# Patient Record
Sex: FEMALE | Race: WHITE | HISPANIC OR LATINO | Employment: UNEMPLOYED | URBAN - METROPOLITAN AREA
[De-identification: names, ages, dates, MRNs, and addresses within clinical notes are randomized per-mention and may not be internally consistent; named-entity substitution may affect disease eponyms.]

---

## 2023-04-18 ENCOUNTER — HOSPITAL ENCOUNTER (INPATIENT)
Facility: HOSPITAL | Age: 28
LOS: 1 days | Discharge: HOME | DRG: 514 | End: 2023-04-20
Attending: EMERGENCY MEDICINE | Admitting: ORTHOPAEDIC SURGERY

## 2023-04-18 ENCOUNTER — ANESTHESIA (OUTPATIENT)
Dept: SURGERY | Facility: HOSPITAL | Age: 28
DRG: 514 | End: 2023-04-18

## 2023-04-18 ENCOUNTER — ANESTHESIA EVENT (OUTPATIENT)
Dept: SURGERY | Facility: HOSPITAL | Age: 28
DRG: 514 | End: 2023-04-18

## 2023-04-18 DIAGNOSIS — S62.609B OPEN FINGER FRACTURE: ICD-10-CM

## 2023-04-18 DIAGNOSIS — S62.625B OPEN DISPLACED FRACTURE OF MIDDLE PHALANX OF LEFT RING FINGER, INITIAL ENCOUNTER: Primary | ICD-10-CM

## 2023-04-18 DIAGNOSIS — S62.605B: Primary | ICD-10-CM

## 2023-04-18 DIAGNOSIS — S62.625B OPEN DISPLACED FRACTURE OF MIDDLE PHALANX OF LEFT RING FINGER, INITIAL ENCOUNTER: ICD-10-CM

## 2023-04-18 LAB
ALBUMIN SERPL BCP-MCNC: 4.9 G/DL (ref 3.5–5.2)
ALP SERPL-CCNC: 79 U/L (ref 38–126)
ALT SERPL W/O P-5'-P-CCNC: 19 U/L (ref 10–44)
ANION GAP SERPL CALC-SCNC: 10 MMOL/L (ref 8–16)
AST SERPL-CCNC: 30 U/L (ref 15–46)
B-HCG UR QL: NEGATIVE
BASOPHILS # BLD AUTO: 0.05 K/UL (ref 0–0.2)
BASOPHILS NFR BLD: 0.4 % (ref 0–1.9)
BILIRUB SERPL-MCNC: 0.3 MG/DL (ref 0.1–1)
CALCIUM SERPL-MCNC: 9.2 MG/DL (ref 8.7–10.5)
CHLORIDE SERPL-SCNC: 105 MMOL/L (ref 95–110)
CO2 SERPL-SCNC: 24 MMOL/L (ref 23–29)
CREAT SERPL-MCNC: 0.75 MG/DL (ref 0.5–1.4)
DIFFERENTIAL METHOD: ABNORMAL
EOSINOPHIL # BLD AUTO: 0.1 K/UL (ref 0–0.5)
EOSINOPHIL NFR BLD: 1.1 % (ref 0–8)
ERYTHROCYTE [DISTWIDTH] IN BLOOD BY AUTOMATED COUNT: 12.7 % (ref 11.5–14.5)
EST. GFR  (NO RACE VARIABLE): >60 ML/MIN/1.73 M^2
GLUCOSE SERPL-MCNC: 94 MG/DL (ref 70–110)
HCT VFR BLD AUTO: 38.7 % (ref 37–48.5)
HGB BLD-MCNC: 13 G/DL (ref 12–16)
IMM GRANULOCYTES # BLD AUTO: 0.05 K/UL (ref 0–0.04)
IMM GRANULOCYTES NFR BLD AUTO: 0.4 % (ref 0–0.5)
LYMPHOCYTES # BLD AUTO: 4.7 K/UL (ref 1–4.8)
LYMPHOCYTES NFR BLD: 38.1 % (ref 18–48)
MCH RBC QN AUTO: 31.1 PG (ref 27–31)
MCHC RBC AUTO-ENTMCNC: 33.6 G/DL (ref 32–36)
MCV RBC AUTO: 93 FL (ref 82–98)
MONOCYTES # BLD AUTO: 0.8 K/UL (ref 0.3–1)
MONOCYTES NFR BLD: 6.3 % (ref 4–15)
NEUTROPHILS # BLD AUTO: 6.6 K/UL (ref 1.8–7.7)
NEUTROPHILS NFR BLD: 53.7 % (ref 38–73)
NRBC BLD-RTO: 0 /100 WBC
PLATELET # BLD AUTO: 263 K/UL (ref 150–450)
PMV BLD AUTO: 10.1 FL (ref 9.2–12.9)
POTASSIUM SERPL-SCNC: 3.5 MMOL/L (ref 3.5–5.1)
PROT SERPL-MCNC: 8 G/DL (ref 6–8.4)
RBC # BLD AUTO: 4.18 M/UL (ref 4–5.4)
SODIUM SERPL-SCNC: 139 MMOL/L (ref 136–145)
UUN UR-MCNC: 16 MG/DL (ref 7–17)
WBC # BLD AUTO: 12.32 K/UL (ref 3.9–12.7)

## 2023-04-18 PROCEDURE — 25000003 PHARM REV CODE 250: Performed by: NURSE ANESTHETIST, CERTIFIED REGISTERED

## 2023-04-18 PROCEDURE — 11012 PR DEBRIDE ASSOC OPEN FX/DISLO SKIN/MUS/BONE: ICD-10-PCS | Mod: 51,,, | Performed by: ORTHOPAEDIC SURGERY

## 2023-04-18 PROCEDURE — 85025 COMPLETE CBC W/AUTO DIFF WBC: CPT | Mod: ER | Performed by: EMERGENCY MEDICINE

## 2023-04-18 PROCEDURE — 99285 EMERGENCY DEPT VISIT HI MDM: CPT | Mod: 25

## 2023-04-18 PROCEDURE — G0378 HOSPITAL OBSERVATION PER HR: HCPCS

## 2023-04-18 PROCEDURE — 63600175 PHARM REV CODE 636 W HCPCS: Performed by: ORTHOPAEDIC SURGERY

## 2023-04-18 PROCEDURE — 29130 APPL FINGER SPLINT STATIC: CPT | Mod: F3

## 2023-04-18 PROCEDURE — 36000708 HC OR TIME LEV III 1ST 15 MIN: Performed by: ORTHOPAEDIC SURGERY

## 2023-04-18 PROCEDURE — 26746 TREAT FINGER FRACTURE EACH: CPT | Mod: F3,,, | Performed by: ORTHOPAEDIC SURGERY

## 2023-04-18 PROCEDURE — 63600175 PHARM REV CODE 636 W HCPCS: Performed by: NURSE ANESTHETIST, CERTIFIED REGISTERED

## 2023-04-18 PROCEDURE — 63600175 PHARM REV CODE 636 W HCPCS: Mod: ER | Performed by: EMERGENCY MEDICINE

## 2023-04-18 PROCEDURE — 96375 TX/PRO/DX INJ NEW DRUG ADDON: CPT

## 2023-04-18 PROCEDURE — 96376 TX/PRO/DX INJ SAME DRUG ADON: CPT

## 2023-04-18 PROCEDURE — 71000033 HC RECOVERY, INTIAL HOUR: Performed by: ORTHOPAEDIC SURGERY

## 2023-04-18 PROCEDURE — 37000008 HC ANESTHESIA 1ST 15 MINUTES: Performed by: ORTHOPAEDIC SURGERY

## 2023-04-18 PROCEDURE — 37000009 HC ANESTHESIA EA ADD 15 MINS: Performed by: ORTHOPAEDIC SURGERY

## 2023-04-18 PROCEDURE — D9220A PRA ANESTHESIA: ICD-10-PCS | Mod: ,,, | Performed by: ANESTHESIOLOGY

## 2023-04-18 PROCEDURE — 80053 COMPREHEN METABOLIC PANEL: CPT | Mod: ER | Performed by: EMERGENCY MEDICINE

## 2023-04-18 PROCEDURE — 63600175 PHARM REV CODE 636 W HCPCS: Performed by: ANESTHESIOLOGY

## 2023-04-18 PROCEDURE — 96365 THER/PROPH/DIAG IV INF INIT: CPT

## 2023-04-18 PROCEDURE — 81025 URINE PREGNANCY TEST: CPT | Performed by: ORTHOPAEDIC SURGERY

## 2023-04-18 PROCEDURE — 96367 TX/PROPH/DG ADDL SEQ IV INF: CPT

## 2023-04-18 PROCEDURE — C1769 GUIDE WIRE: HCPCS | Performed by: ORTHOPAEDIC SURGERY

## 2023-04-18 PROCEDURE — 11012 DEB SKIN BONE AT FX SITE: CPT | Mod: 51,,, | Performed by: ORTHOPAEDIC SURGERY

## 2023-04-18 PROCEDURE — 36000709 HC OR TIME LEV III EA ADD 15 MIN: Performed by: ORTHOPAEDIC SURGERY

## 2023-04-18 PROCEDURE — D9220A PRA ANESTHESIA: Mod: ,,, | Performed by: ANESTHESIOLOGY

## 2023-04-18 PROCEDURE — 90471 IMMUNIZATION ADMIN: CPT | Mod: ER | Performed by: EMERGENCY MEDICINE

## 2023-04-18 PROCEDURE — 90715 TDAP VACCINE 7 YRS/> IM: CPT | Mod: ER | Performed by: EMERGENCY MEDICINE

## 2023-04-18 PROCEDURE — 26746 PR OPEN TX ARTICULAR FRACTURE MCP/IP JOINT EA: ICD-10-PCS | Mod: F3,,, | Performed by: ORTHOPAEDIC SURGERY

## 2023-04-18 DEVICE — KWIRE 9 .035 9MM: Type: IMPLANTABLE DEVICE | Site: FINGER | Status: FUNCTIONAL

## 2023-04-18 RX ORDER — SUCCINYLCHOLINE CHLORIDE 20 MG/ML
INJECTION INTRAMUSCULAR; INTRAVENOUS
Status: DISCONTINUED | OUTPATIENT
Start: 2023-04-18 | End: 2023-04-18

## 2023-04-18 RX ORDER — VANCOMYCIN HYDROCHLORIDE 1 G/20ML
INJECTION, POWDER, LYOPHILIZED, FOR SOLUTION INTRAVENOUS
Status: DISCONTINUED | OUTPATIENT
Start: 2023-04-18 | End: 2023-04-18 | Stop reason: HOSPADM

## 2023-04-18 RX ORDER — ONDANSETRON 2 MG/ML
4 INJECTION INTRAMUSCULAR; INTRAVENOUS EVERY 6 HOURS PRN
Status: DISCONTINUED | OUTPATIENT
Start: 2023-04-18 | End: 2023-04-20 | Stop reason: HOSPADM

## 2023-04-18 RX ORDER — SODIUM CHLORIDE 0.9 % (FLUSH) 0.9 %
10 SYRINGE (ML) INJECTION
Status: DISCONTINUED | OUTPATIENT
Start: 2023-04-18 | End: 2023-04-18

## 2023-04-18 RX ORDER — CEFAZOLIN SODIUM 2 G/50ML
2 SOLUTION INTRAVENOUS
Status: COMPLETED | OUTPATIENT
Start: 2023-04-19 | End: 2023-04-20

## 2023-04-18 RX ORDER — CEFAZOLIN SODIUM 1 G/3ML
INJECTION, POWDER, FOR SOLUTION INTRAMUSCULAR; INTRAVENOUS
Status: DISCONTINUED | OUTPATIENT
Start: 2023-04-18 | End: 2023-04-18

## 2023-04-18 RX ORDER — ONDANSETRON 2 MG/ML
4 INJECTION INTRAMUSCULAR; INTRAVENOUS
Status: COMPLETED | OUTPATIENT
Start: 2023-04-18 | End: 2023-04-18

## 2023-04-18 RX ORDER — CEFAZOLIN SODIUM 1 G/50ML
1 SOLUTION INTRAVENOUS
Status: COMPLETED | OUTPATIENT
Start: 2023-04-18 | End: 2023-04-18

## 2023-04-18 RX ORDER — CEFAZOLIN SODIUM 2 G/50ML
2 SOLUTION INTRAVENOUS
Status: CANCELLED | OUTPATIENT
Start: 2023-04-18

## 2023-04-18 RX ORDER — MORPHINE SULFATE 4 MG/ML
6 INJECTION, SOLUTION INTRAMUSCULAR; INTRAVENOUS
Status: COMPLETED | OUTPATIENT
Start: 2023-04-18 | End: 2023-04-18

## 2023-04-18 RX ORDER — ONDANSETRON 2 MG/ML
INJECTION INTRAMUSCULAR; INTRAVENOUS
Status: DISCONTINUED | OUTPATIENT
Start: 2023-04-18 | End: 2023-04-18

## 2023-04-18 RX ORDER — CEFAZOLIN SODIUM 2 G/50ML
2 SOLUTION INTRAVENOUS
Status: DISCONTINUED | OUTPATIENT
Start: 2023-04-18 | End: 2023-04-19

## 2023-04-18 RX ORDER — FENTANYL CITRATE 50 UG/ML
INJECTION, SOLUTION INTRAMUSCULAR; INTRAVENOUS
Status: DISCONTINUED | OUTPATIENT
Start: 2023-04-18 | End: 2023-04-18

## 2023-04-18 RX ORDER — TRAMADOL HYDROCHLORIDE 50 MG/1
50 TABLET ORAL EVERY 4 HOURS PRN
Status: DISCONTINUED | OUTPATIENT
Start: 2023-04-18 | End: 2023-04-20 | Stop reason: HOSPADM

## 2023-04-18 RX ORDER — LIDOCAINE HYDROCHLORIDE 20 MG/ML
INJECTION INTRAVENOUS
Status: DISCONTINUED | OUTPATIENT
Start: 2023-04-18 | End: 2023-04-18

## 2023-04-18 RX ORDER — HYDROMORPHONE HYDROCHLORIDE 2 MG/ML
0.5 INJECTION, SOLUTION INTRAMUSCULAR; INTRAVENOUS; SUBCUTANEOUS EVERY 5 MIN PRN
Status: DISCONTINUED | OUTPATIENT
Start: 2023-04-18 | End: 2023-04-18

## 2023-04-18 RX ORDER — CIPROFLOXACIN 2 MG/ML
400 INJECTION, SOLUTION INTRAVENOUS
Status: COMPLETED | OUTPATIENT
Start: 2023-04-18 | End: 2023-04-20

## 2023-04-18 RX ORDER — ONDANSETRON 2 MG/ML
4 INJECTION INTRAMUSCULAR; INTRAVENOUS DAILY PRN
Status: DISCONTINUED | OUTPATIENT
Start: 2023-04-18 | End: 2023-04-18

## 2023-04-18 RX ORDER — KETOROLAC TROMETHAMINE 30 MG/ML
30 INJECTION, SOLUTION INTRAMUSCULAR; INTRAVENOUS ONCE
Status: COMPLETED | OUTPATIENT
Start: 2023-04-18 | End: 2023-04-18

## 2023-04-18 RX ORDER — MIDAZOLAM HYDROCHLORIDE 1 MG/ML
INJECTION INTRAMUSCULAR; INTRAVENOUS
Status: DISCONTINUED | OUTPATIENT
Start: 2023-04-18 | End: 2023-04-18

## 2023-04-18 RX ORDER — OXYCODONE AND ACETAMINOPHEN 7.5; 325 MG/1; MG/1
1 TABLET ORAL EVERY 4 HOURS PRN
Status: DISCONTINUED | OUTPATIENT
Start: 2023-04-18 | End: 2023-04-20 | Stop reason: HOSPADM

## 2023-04-18 RX ORDER — PROPOFOL 10 MG/ML
VIAL (ML) INTRAVENOUS
Status: DISCONTINUED | OUTPATIENT
Start: 2023-04-18 | End: 2023-04-18

## 2023-04-18 RX ORDER — KETOROLAC TROMETHAMINE 30 MG/ML
INJECTION, SOLUTION INTRAMUSCULAR; INTRAVENOUS
Status: DISCONTINUED | OUTPATIENT
Start: 2023-04-18 | End: 2023-04-18

## 2023-04-18 RX ADMIN — SODIUM CHLORIDE, SODIUM LACTATE, POTASSIUM CHLORIDE, AND CALCIUM CHLORIDE: .6; .31; .03; .02 INJECTION, SOLUTION INTRAVENOUS at 04:04

## 2023-04-18 RX ADMIN — MIDAZOLAM HYDROCHLORIDE 2 MG: 1 INJECTION, SOLUTION INTRAMUSCULAR; INTRAVENOUS at 04:04

## 2023-04-18 RX ADMIN — MORPHINE SULFATE 6 MG: 4 INJECTION INTRAVENOUS at 12:04

## 2023-04-18 RX ADMIN — KETOROLAC TROMETHAMINE 30 MG: 30 INJECTION, SOLUTION INTRAMUSCULAR; INTRAVENOUS at 05:04

## 2023-04-18 RX ADMIN — TETANUS TOXOID, REDUCED DIPHTHERIA TOXOID AND ACELLULAR PERTUSSIS VACCINE, ADSORBED 0.5 ML: 5; 2.5; 8; 8; 2.5 SUSPENSION INTRAMUSCULAR at 12:04

## 2023-04-18 RX ADMIN — HYDROMORPHONE HYDROCHLORIDE 0.5 MG: 2 INJECTION, SOLUTION INTRAMUSCULAR; INTRAVENOUS; SUBCUTANEOUS at 05:04

## 2023-04-18 RX ADMIN — ONDANSETRON 8 MG: 2 INJECTION, SOLUTION INTRAMUSCULAR; INTRAVENOUS at 05:04

## 2023-04-18 RX ADMIN — FENTANYL CITRATE 100 MCG: 50 INJECTION, SOLUTION INTRAMUSCULAR; INTRAVENOUS at 04:04

## 2023-04-18 RX ADMIN — CEFAZOLIN 2 G: 330 INJECTION, POWDER, FOR SOLUTION INTRAMUSCULAR; INTRAVENOUS at 04:04

## 2023-04-18 RX ADMIN — CIPROFLOXACIN 400 MG: 2 INJECTION, SOLUTION INTRAVENOUS at 09:04

## 2023-04-18 RX ADMIN — HYDROMORPHONE HYDROCHLORIDE 0.5 MG: 2 INJECTION, SOLUTION INTRAMUSCULAR; INTRAVENOUS; SUBCUTANEOUS at 06:04

## 2023-04-18 RX ADMIN — ONDANSETRON HYDROCHLORIDE 4 MG: 2 INJECTION, SOLUTION INTRAMUSCULAR; INTRAVENOUS at 12:04

## 2023-04-18 RX ADMIN — ONDANSETRON HYDROCHLORIDE 4 MG: 2 INJECTION, SOLUTION INTRAMUSCULAR; INTRAVENOUS at 10:04

## 2023-04-18 RX ADMIN — SUCCINYLCHOLINE CHLORIDE 200 MG: 20 INJECTION, SOLUTION INTRAMUSCULAR; INTRAVENOUS at 04:04

## 2023-04-18 RX ADMIN — CEFAZOLIN SODIUM 1 G: 1 SOLUTION INTRAVENOUS at 12:04

## 2023-04-18 RX ADMIN — PROPOFOL 150 MG: 10 INJECTION, EMULSION INTRAVENOUS at 04:04

## 2023-04-18 RX ADMIN — LIDOCAINE HYDROCHLORIDE 50 MG: 20 INJECTION, SOLUTION INTRAVENOUS at 04:04

## 2023-04-18 RX ADMIN — KETOROLAC TROMETHAMINE 30 MG: 30 INJECTION, SOLUTION INTRAMUSCULAR; INTRAVENOUS at 08:04

## 2023-04-18 NOTE — PHARMACY MED REC
"  [facility]           Pharmacy  Admission Medication History     The home medication history was taken by Barbara Amaral.      Medication history obtained from Medications listed below were obtained from: Patient/family.Patient states she is not taking any medications    Based on information gathered for medication list, you may go to "Admission" then "Reconcile Home Medications" tabs to review and/or act upon those items.     The home medication list has been updated by the Pharmacy department.   Please read ALL comments highlighted in yellow.   Please address this information as you see fit.    Feel free to contact us if you have any questions or require assistance.        No current facility-administered medications on file prior to encounter.     No current outpatient medications on file prior to encounter.       Please address this information as you see fit.  Feel free to contact us if you have any questions or require assistance.    Barbara Amaral  [phone]                .        "

## 2023-04-18 NOTE — ANESTHESIA PROCEDURE NOTES
Intubation    Date/Time: 4/18/2023 4:21 PM  Performed by: Noe Cherry CRNA  Authorized by: Christian Sorto MD     Intubation:     Induction:  Intravenous    Intubated:  Postinduction    Mask Ventilation:  Easy mask    Attempts:  1    Attempted By:  CRNA    Method of Intubation:  Video laryngoscopy    Blade:  Beck 3    Laryngeal View Grade: Grade I - full view of cords      Difficult Airway Encountered?: No      Complications:  None    Airway Device:  Oral endotracheal tube    Airway Device Size:  7.5    Style/Cuff Inflation:  Cuffed (inflated to minimal occlusive pressure)    Tube secured:  21    Secured at:  The teeth    Placement Verified By:  Capnometry    Complicating Factors:  None    Findings Post-Intubation:  BS equal bilateral and atraumatic/condition of teeth unchanged

## 2023-04-18 NOTE — NURSING
Patient received from ED in stable condition. Pt oriented to room and call light. Pre op bath given with clean gown. Urine pregnancy test pending. Awaiting for surgery to transport patient.

## 2023-04-18 NOTE — ANESTHESIA PREPROCEDURE EVALUATION
04/18/2023  Maricel Amezquita is a 28 y.o., female.      Pre-op Assessment    I have reviewed the NPO Status.   I have reviewed the Medications.     Review of Systems  Anesthesia Hx:  No problems with previous Anesthesia  Denies Family Hx of Anesthesia complications.   Denies Personal Hx of Anesthesia complications.   Social:  Non-Smoker    Hematology/Oncology:  Hematology Normal   Oncology Normal     EENT/Dental:EENT/Dental Normal   Cardiovascular:  Cardiovascular Normal     Pulmonary:  Pulmonary Normal    Renal/:  Renal/ Normal     Hepatic/GI:  Hepatic/GI Normal    Musculoskeletal:   Left Ring Finger Laceration and Fx   Neurological:  Neurology Normal    Endocrine:  Endocrine Normal    Dermatological:  Skin Normal    Psych:  Psychiatric Normal           Physical Exam  General: Alert and Oriented    Airway:  Mallampati: II   Mouth Opening: Normal  TM Distance: Normal  Tongue: Normal  Neck ROM: Normal ROM    Chest/Lungs:  Clear to auscultation, Normal Respiratory Rate    Heart:  Rate: Normal  Rhythm: Regular Rhythm  Sounds: Normal        Anesthesia Plan  Type of Anesthesia, risks & benefits discussed:    Anesthesia Type: Gen ETT  Intra-op Monitoring Plan: Standard ASA Monitors  Post Op Pain Control Plan: multimodal analgesia and IV/PO Opioids PRN  Induction:  IV  Airway Plan: Direct and Video  Informed Consent: Informed consent signed with the Patient and all parties understand the risks and agree with anesthesia plan.  All questions answered.   ASA Score: 1  Day of Surgery Review of History & Physical: H&P Update referred to the surgeon/provider.    Ready For Surgery From Anesthesia Perspective.     .

## 2023-04-18 NOTE — ED PROVIDER NOTES
Encounter Date: 4/18/2023       History     Chief Complaint   Patient presents with    Laceration     I cut my left ring finger cutting meat with the machine just now.  (Avulsion noted to left 4th finger)      28-year-old female presents after cutting her left ring finger while cutting meat with the machine.  Last tetanus unknown.  Patient wearing to gloves while cutting.  Patient is right-handed.  Patient complains of some numbness to her left ring finger.    History obtained using .    Review of patient's allergies indicates:  No Known Allergies  History reviewed. No pertinent past medical history.  History reviewed. No pertinent surgical history.  History reviewed. No pertinent family history.  Social History     Tobacco Use    Smoking status: Never    Smokeless tobacco: Never     Review of Systems   Skin:  Positive for wound.   Neurological:  Positive for numbness.     Physical Exam     Initial Vitals [04/18/23 1156]   BP Pulse Resp Temp SpO2   (!) 141/86 82 (!) 28 98.3 °F (36.8 °C) 100 %      MAP       --         Physical Exam    Nursing note and vitals reviewed.  Constitutional: She appears distressed.   HENT:   Head: Atraumatic.   Musculoskeletal:      Comments: Open fracture of the left ring finger at the proximal phalanx.  FDP disrupted.     Neurological: She is alert and oriented to person, place, and time.         ED Course   Orthopedic Injury    Date/Time: 4/18/2023 12:37 PM  Performed by: Carlos A Flores MD  Authorized by: Buster Manuel Jr., MD     Location procedure was performed:  [facility] EMERGENCY DEPARTMENT  Injury:     Injury location:  Finger    Location details:  Left ring finger    Injury type:  Fracture (Proximal and middle phalanx including PIP joint)      Pre-procedure assessment:     Neurovascular status: Neurovascularly intact      Distal perfusion: normal      Neurological function: normal      Range of motion: reduced        Selections made in this section will also  lock the Injury type section above.:     Manipulation performed?: No      Immobilization: moist sterile dressing.  Post-procedure assessment:     Neurovascular status: Neurovascularly intact      Distal perfusion: normal      Neurological function: normal      Range of motion: unchanged      Patient tolerance:  Patient tolerated the procedure well with no immediate complications  Labs Reviewed   CBC W/ AUTO DIFFERENTIAL - Abnormal; Notable for the following components:       Result Value    MCH 31.1 (*)     Immature Grans (Abs) 0.05 (*)     All other components within normal limits   COMPREHENSIVE METABOLIC PANEL          Imaging Results              X-Ray Finger 2 or More Views Left (Final result)  Result time 04/18/23 12:21:16      Final result by JIMMIE Peña Sr., MD (04/18/23 12:21:16)                   Impression:      There are fractures involving the distal end of the proximal phalanx and the proximal end of the middle phalanx of the ring finger.  The fracture fragments are displaced dorsally and medially.      Electronically signed by: Damián Peña MD  Date:    04/18/2023  Time:    12:21               Narrative:    EXAMINATION:  XR FINGER 2 OR MORE VIEWS LEFT    CLINICAL HISTORY:  trauma;    COMPARISON:  None    FINDINGS:  There are fractures involving the distal end of the proximal phalanx and the proximal end of the middle phalanx of the ring finger.  The fracture fragments are displaced dorsally and medially.                                       Medications   ceFAZolin (ANCEF) 1 gram in dextrose 5 % 50 mL IVPB (premix) (1 g Intravenous New Bag 4/18/23 1220)   morphine injection 6 mg (has no administration in time range)   Tdap (BOOSTRIX) vaccine injection 0.5 mL (0.5 mLs Intramuscular Given 4/18/23 1211)   morphine injection 6 mg (6 mg Intravenous Given 4/18/23 1205)   ondansetron injection 4 mg (4 mg Intravenous Given 4/18/23 1211)     Medical Decision Making:   Initial Assessment:   28-year-old  female presenting after finger injury where meat cutting machine caused partial amputation and open fracture of the left proximal ring finger.  Patient does have sensation though obvious tendon disruption and open fracture visible.  Patient's tetanus updated.  Ancef given.  Wound thoroughly irrigated and finger splinted.  Will need hand surgery evaluation.           ED Course as of 04/18/23 1238   Tue Apr 18, 2023   1236 X-Ray Finger 2 or More Views Left  Open despite fracture of proximal and middle phalanx of left ring finger.  Discussed case with Dr. Manuel who has accepted patient for transfer for admission for further surgical management. [SN]      ED Course User Index  [SN] Carlos A Flores MD                 Clinical Impression:   Final diagnoses:  [H01.522V] Open nondisplaced fracture of phalanx of left ring finger, unspecified phalanx, initial encounter (Primary)        ED Disposition Condition    Observation Stable                Carlos A Flores MD  04/18/23 1237

## 2023-04-18 NOTE — TRANSFER OF CARE
Anesthesia Transfer of Care Note    Patient: Maricel Amezquita    Procedure(s) Performed: Procedure(s) (LRB):  ORIF, FINGER (Left)    Patient location: PACU    Anesthesia Type: general    Transport from OR: Transported from OR on 6-10 L/min O2 by face mask with adequate spontaneous ventilation    Post pain: adequate analgesia    Post assessment: no apparent anesthetic complications and tolerated procedure well    Post vital signs: stable    Level of consciousness: awake    Nausea/Vomiting: no nausea/vomiting    Complications: none    Transfer of care protocol was followed      Last vitals:   Visit Vitals  /70   Pulse 87   Temp 36.8 °C (98.2 °F)   Resp 19   Ht 5' (1.524 m)   Wt 61.6 kg (135 lb 12.9 oz)   LMP 04/16/2023 (Exact Date)   SpO2 100%   Breastfeeding No   BMI 26.52 kg/m²

## 2023-04-18 NOTE — H&P
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 female sustained injury to her left ring finger earlier today at work   She was cut with a meat cutting device   She was seen at [facility] emergency room noted to have an open fracture of the PIP joint with near complete amputation   There was felt to be some vascular flow to the tip of the finger show the patient was chance transferred to [place] for surgical treatment  She was seen today with the hospital  system         PAST MEDICAL HISTORY: History reviewed. No pertinent past medical history.  PAST SURGICAL HISTORY: History reviewed. No pertinent surgical history.  FAMILY HISTORY: History reviewed. No pertinent family history.  SOCIAL HISTORY:   Social History     Socioeconomic History    Marital status: Single   Tobacco Use    Smoking status: Never    Smokeless tobacco: Never       MEDICATIONS:   Current Facility-Administered Medications:     cefazolin (ANCEF) 2 gram in dextrose 5% 50 mL IVPB (premix), 2 g, Intravenous, On Call Procedure, Buster Manuel Jr., MD  ALLERGIES: Review of patient's allergies indicates:  No Known Allergies    Review of Systems:  Constitutional: no fever or chills  ENT: no nasal congestion or sore throat  Respiratory: no cough or shortness of breath  Cardiovascular: no chest pain or palpitations  Gastrointestinal: no nausea or vomiting, PUD, GERD, NSAID intolerance  Genitourinary: no hematuria or dysuria  Integument/Breast: no rash or pruritis  Hematologic/Lymphatic: no easy bruising or lymphadenopathy  Musculoskeletal: see HPI  Neurological: no seizures or tremors  Behavioral/Psych: no auditory or visual hallucinations      Physical Exam   Vitals:    04/18/23 1225 04/18/23 1238 04/18/23 1300 04/18/23 1501   BP: 113/69  116/70    Pulse: 70  87    Resp: (!) 22 18 19    Temp:   98.2 °F (36.8 °C)    TempSrc:       SpO2: 100%      Weight:    61.6 kg (135 lb  "12.9 oz)   Height:    5' (1.524 m)       Constitutional: Oriented to person, place, and time. Appears well-developed and well-nourished.   HENT:   Head: Normocephalic and atraumatic.   Nose: Nose normal.   Eyes: No scleral icterus.   Neck: Normal range of motion.   Cardiovascular: Normal rate and regular rhythm.    Pulses:       Radial pulses are 2+ on the right side, and 2+ on the left side.   Pulmonary/Chest: Effort normal and breath sounds normal.   Abdominal: Soft.   Neurological: Alert and oriented to person, place, and time.   Skin: Skin is warm.   Psychiatric: Normal mood and affect.     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  The consent was signed in the office for surgery.      Buster Manuel MD (Jay)  [facility]  Orthopedic Upper Extremity Surgery     "

## 2023-04-18 NOTE — PLAN OF CARE
Pt arrived to unit. Introduced self as VN for this shift. Admission questions completed by VN in Sudanese. Educated pt on VTE risk, safety precautions, and VN's role in pt care. Opportunity given for pt's questions. All questions answered. Family at bedside    Problem: Adult Inpatient Plan of Care  Goal: Plan of Care Review  4/18/2023 1530 by Yanet Conley RN  Outcome: Ongoing, Progressing  4/18/2023 1530 by Yanet Conley RN  Outcome: Ongoing, Progressing  Goal: Patient-Specific Goal (Individualized)  4/18/2023 1530 by Yanet Conley RN  Outcome: Ongoing, Progressing  4/18/2023 1530 by Yanet Conley RN  Outcome: Ongoing, Progressing  Goal: Absence of Hospital-Acquired Illness or Injury  4/18/2023 1530 by Yanet Conley RN  Outcome: Ongoing, Progressing  4/18/2023 1530 by Yanet Conley RN  Outcome: Ongoing, Progressing  Goal: Optimal Comfort and Wellbeing  4/18/2023 1530 by Yanet Conley RN  Outcome: Ongoing, Progressing  4/18/2023 1530 by Yanet Conley RN  Outcome: Ongoing, Progressing  Goal: Readiness for Transition of Care  4/18/2023 1530 by Yanet Conley RN  Outcome: Ongoing, Progressing  4/18/2023 1530 by Yanet Conley RN  Outcome: Ongoing, Progressing     Problem: Pain Acute  Goal: Acceptable Pain Control and Functional Ability  4/18/2023 1530 by Yanet Conley RN  Outcome: Ongoing, Progressing  4/18/2023 1530 by Yanet Conley RN  Outcome: Ongoing, Progressing     Problem: Bleeding (Surgery Nonspecified)  Goal: Absence of Bleeding  4/18/2023 1530 by Yanet Conley RN  Outcome: Ongoing, Progressing  4/18/2023 1530 by Yanet Conley RN  Outcome: Ongoing, Progressing     Problem: Bowel Motility Impaired (Surgery Nonspecified)  Goal: Effective Bowel Elimination  4/18/2023 1530 by Yanet Conley RN  Outcome: Ongoing, Progressing  4/18/2023 1530 by Yanet Conley RN  Outcome: Ongoing, Progressing     Problem: Fluid and Electrolyte Imbalance (Surgery Nonspecified)  Goal: Fluid and Electrolyte Balance  4/18/2023 1530 by Yanet YAN  SARITHA Conley  Outcome: Ongoing, Progressing  4/18/2023 1530 by Yanet Conley RN  Outcome: Ongoing, Progressing     Problem: Glycemic Control Impaired (Surgery Nonspecified)  Goal: Blood Glucose Level Within Targeted Range  4/18/2023 1530 by Yanet Conley RN  Outcome: Ongoing, Progressing  4/18/2023 1530 by Yanet Conley RN  Outcome: Ongoing, Progressing     Problem: Infection (Surgery Nonspecified)  Goal: Absence of Infection Signs and Symptoms  4/18/2023 1530 by Yanet Conley RN  Outcome: Ongoing, Progressing  4/18/2023 1530 by Yanet Conley RN  Outcome: Ongoing, Progressing     Problem: Ongoing Anesthesia Effects (Surgery Nonspecified)  Goal: Anesthesia/Sedation Recovery  4/18/2023 1530 by Yanet Conley RN  Outcome: Ongoing, Progressing  4/18/2023 1530 by Yanet Cnoley RN  Outcome: Ongoing, Progressing     Problem: Pain (Surgery Nonspecified)  Goal: Acceptable Pain Control  4/18/2023 1530 by Yanet Conley RN  Outcome: Ongoing, Progressing  4/18/2023 1530 by Yanet Conley RN  Outcome: Ongoing, Progressing     Problem: Postoperative Nausea and Vomiting (Surgery Nonspecified)  Goal: Nausea and Vomiting Relief  4/18/2023 1530 by Yanet Conley RN  Outcome: Ongoing, Progressing  4/18/2023 1530 by Yanet Conley RN  Outcome: Ongoing, Progressing     Problem: Postoperative Urinary Retention (Surgery Nonspecified)  Goal: Effective Urinary Elimination  4/18/2023 1530 by Yanet Conley RN  Outcome: Ongoing, Progressing  4/18/2023 1530 by Yanet Conley RN  Outcome: Ongoing, Progressing     Problem: Respiratory Compromise (Surgery Nonspecified)  Goal: Effective Oxygenation and Ventilation  4/18/2023 1530 by Yanet Conley RN  Outcome: Ongoing, Progressing  4/18/2023 1530 by Yanet Conley RN  Outcome: Ongoing, Progressing

## 2023-04-18 NOTE — ED NOTES
18G IV placed to Right FA at this time; blood drawn labeled and placed at bedside MD at bedside. Nola MELARA at bedside for translation. Bleeding controlled at this time. Patient presents with a left ring figner ( 4th digit) injury;  versus finger. Finger is still present patient does attest to some numbness/decrease sensation to the finger. Physician Photo of wound attached into chart.

## 2023-04-18 NOTE — OP NOTE
[facility] Surg  Surgery Department  Operative Note    SUMMARY     Date of Procedure: 4/18/2023     Procedure: Procedure(s) (LRB):  ORIF, FINGER (Left)     Surgeon(s) and Role:     * Buster Manuel Jr., MD - Primary    Assisting Surgeon: None    Pre-Operative Diagnosis: Open displaced fracture of middle phalanx of left ring finger, initial encounter [S62.625B]    Post-Operative Diagnosis: Post-Op Diagnosis Codes:     * Open displaced fracture of middle phalanx of left ring finger, initial encounter [S62.860K]    Anesthesia: General    Operative Findings (including complications, if any):  Complex open fracture left ring finger    Description of Technical Procedures:     Preop diagnosis:  Open fracture PIP joint left ring finger.      Postop diagnosis: Same.      Operative procedure: 1.  Irrigation with excisional debridement of soft tissue including tendon and bone left ring finger.      2. Open reduction internal fixation proximal phalanx fracture of the left ring finger using K-wires.      Surgeon: Kaleb.      Anesthesia:  General.      EBL: Minimal.      Complications:  None.      Operative procedure in detail as follows:    After operative consent was obtained patient brought the operating room placed supine operating table.  Anesthesia by general endotracheal method performed by the anesthesia staff.  After the patient was asleep tourniquet applied left arm left upper extremity prepped and draped out normal sterile fashion.  Esmarch used to exsanguinated the limb and the tourniquet inflated 225 mmHg.      The  wound was inspected and noted to be severely complex and with exposed bone and complete destruction of the joint there was some articular surface left but only about maybe 25% of the normal articular surface there was bone loss at the distal condyles of the proximal phalanx of the ring finger.  Initially thorough irrigation was performed with excisional debridement of some soft tissue including  tendon and capsule and bone fragments dorsally.  1 large fragment of condyle was still left attached soft tissue so it was repaired to the shaft of the proximal phalanx and secured with 2 K-wires placed percutaneously.      The joint was then reduced and then the joint itself was pinned in slight flexion again with 2 additional K-wires holding stability and rotation as best could be accomplished.  There was significant bone loss as well as joint loss.  The wound irrigated again with antibiotic saline solution hemostasis achieved with Bovie.  The skin was then closed dorsally after debriding some nonviable edges with interrupted 5 O nylon horizontal mattress suture all pins were bent and capped above the skin  Sterile dressing applied followed by a bulky wrap and a dorsal splint.  The tourniquet deflated and the patient brought to the recovery room in stable condition all sponge needle counts reported as correct     After releasing the tourniquet the tip was noted to be viable with good capillary refill and vascularity    Significant Surgical Tasks Conducted by the Assistant(s), if Applicable: na    Estimated Blood Loss (EBL): 5 mL           Implants: * No implants in log *    Specimens:   Specimen (24h ago, onward)      None                    Condition: Good    Disposition: PACU - hemodynamically stable.    Attestation: I was present and scrubbed for the entire procedure.

## 2023-04-19 ENCOUNTER — TELEPHONE (OUTPATIENT)
Dept: ORTHOPEDICS | Facility: CLINIC | Age: 28
End: 2023-04-19

## 2023-04-19 DIAGNOSIS — S62.625B OPEN DISPLACED FRACTURE OF MIDDLE PHALANX OF LEFT RING FINGER, INITIAL ENCOUNTER: Primary | ICD-10-CM

## 2023-04-19 PROCEDURE — 11000001 HC ACUTE MED/SURG PRIVATE ROOM

## 2023-04-19 PROCEDURE — 25000003 PHARM REV CODE 250: Performed by: ORTHOPAEDIC SURGERY

## 2023-04-19 PROCEDURE — 96376 TX/PRO/DX INJ SAME DRUG ADON: CPT

## 2023-04-19 PROCEDURE — 96366 THER/PROPH/DIAG IV INF ADDON: CPT

## 2023-04-19 PROCEDURE — 63600175 PHARM REV CODE 636 W HCPCS: Performed by: ORTHOPAEDIC SURGERY

## 2023-04-19 RX ORDER — KETOROLAC TROMETHAMINE 30 MG/ML
30 INJECTION, SOLUTION INTRAMUSCULAR; INTRAVENOUS ONCE
Status: COMPLETED | OUTPATIENT
Start: 2023-04-19 | End: 2023-04-19

## 2023-04-19 RX ORDER — MUPIROCIN 20 MG/G
OINTMENT TOPICAL 2 TIMES DAILY
Status: DISCONTINUED | OUTPATIENT
Start: 2023-04-19 | End: 2023-04-20 | Stop reason: HOSPADM

## 2023-04-19 RX ORDER — OXYCODONE AND ACETAMINOPHEN 10; 325 MG/1; MG/1
1 TABLET ORAL EVERY 6 HOURS PRN
Status: DISCONTINUED | OUTPATIENT
Start: 2023-04-19 | End: 2023-04-20 | Stop reason: HOSPADM

## 2023-04-19 RX ORDER — ONDANSETRON 2 MG/ML
4 INJECTION INTRAMUSCULAR; INTRAVENOUS ONCE
Status: COMPLETED | OUTPATIENT
Start: 2023-04-19 | End: 2023-04-19

## 2023-04-19 RX ADMIN — MUPIROCIN: 20 OINTMENT TOPICAL at 09:04

## 2023-04-19 RX ADMIN — ONDANSETRON HYDROCHLORIDE 4 MG: 2 SOLUTION INTRAMUSCULAR; INTRAVENOUS at 02:04

## 2023-04-19 RX ADMIN — OXYCODONE AND ACETAMINOPHEN 1 TABLET: 7.5; 325 TABLET ORAL at 12:04

## 2023-04-19 RX ADMIN — ONDANSETRON HYDROCHLORIDE 4 MG: 2 INJECTION, SOLUTION INTRAMUSCULAR; INTRAVENOUS at 10:04

## 2023-04-19 RX ADMIN — CEFAZOLIN SODIUM 2 G: 2 SOLUTION INTRAVENOUS at 01:04

## 2023-04-19 RX ADMIN — CIPROFLOXACIN 400 MG: 2 INJECTION, SOLUTION INTRAVENOUS at 07:04

## 2023-04-19 RX ADMIN — TRAMADOL HYDROCHLORIDE 50 MG: 50 TABLET, COATED ORAL at 01:04

## 2023-04-19 RX ADMIN — KETOROLAC TROMETHAMINE 30 MG: 30 INJECTION, SOLUTION INTRAMUSCULAR; INTRAVENOUS at 05:04

## 2023-04-19 RX ADMIN — CEFAZOLIN SODIUM 2 G: 2 SOLUTION INTRAVENOUS at 11:04

## 2023-04-19 RX ADMIN — CIPROFLOXACIN 400 MG: 2 INJECTION, SOLUTION INTRAVENOUS at 09:04

## 2023-04-19 RX ADMIN — OXYCODONE AND ACETAMINOPHEN 1 TABLET: 7.5; 325 TABLET ORAL at 04:04

## 2023-04-19 RX ADMIN — CEFAZOLIN SODIUM 2 G: 2 SOLUTION INTRAVENOUS at 08:04

## 2023-04-19 RX ADMIN — CEFAZOLIN SODIUM 2 G: 2 SOLUTION INTRAVENOUS at 03:04

## 2023-04-19 RX ADMIN — OXYCODONE AND ACETAMINOPHEN 1 TABLET: 7.5; 325 TABLET ORAL at 02:04

## 2023-04-19 RX ADMIN — OXYCODONE AND ACETAMINOPHEN 1 TABLET: 7.5; 325 TABLET ORAL at 09:04

## 2023-04-19 RX ADMIN — TRAMADOL HYDROCHLORIDE 50 MG: 50 TABLET, COATED ORAL at 03:04

## 2023-04-19 RX ADMIN — OXYCODONE AND ACETAMINOPHEN 1 TABLET: 7.5; 325 TABLET ORAL at 07:04

## 2023-04-19 NOTE — PLAN OF CARE
Problem: Adult Inpatient Plan of Care  Goal: Plan of Care Review  Outcome: Ongoing, Progressing  Goal: Absence of Hospital-Acquired Illness or Injury  Outcome: Ongoing, Progressing     Problem: Pain Acute  Goal: Acceptable Pain Control and Functional Ability  Outcome: Ongoing, Progressing

## 2023-04-19 NOTE — TELEPHONE ENCOUNTER
----- Message from Melissa Lima sent at 4/19/2023 11:10 AM CDT -----  Regarding: HFU  Patient will be discharging from [facility] and HFU with Ortho was requested.  Patient had a procedure performed.  Please schedule as appropriate and message me back so DC can relay appointment information to patient prior to discharge.    DX: Open finger fracture    Julieta Arciniega  Physician Referral Specialist/Discharge

## 2023-04-19 NOTE — PLAN OF CARE
SW met with pt at bedside to complete assessment. SW supported by mother at bedside. Pt primary language is Georgian. SW to complete rounds and assess pt with  supports.     No future appointments.    RODOLFO Tillman  [facility]  [phone]

## 2023-04-19 NOTE — ANESTHESIA POSTPROCEDURE EVALUATION
Anesthesia Post Evaluation    Patient: Maricel Amezquita    Procedure(s) Performed: Procedure(s) (LRB):  ORIF, FINGER (Left)    Final Anesthesia Type: general      Patient location during evaluation: PACU  Patient participation: Yes- Able to Participate  Level of consciousness: awake and alert  Post-procedure vital signs: reviewed and stable  Pain management: adequate  Airway patency: patent    PONV status at discharge: No PONV  Anesthetic complications: no      Cardiovascular status: blood pressure returned to baseline  Respiratory status: unassisted  Hydration status: euvolemic  Follow-up not needed.          Vitals Value Taken Time   /56 04/18/23 2004   Temp 36.7 °C (98 °F) 04/18/23 2215   Pulse 69 04/18/23 2004   Resp 20 04/18/23 2004   SpO2 96 % 04/18/23 2004         Event Time   Out of Recovery 18:38:48         Pain/Vinayak Score: Pain Rating Prior to Med Admin: 5 (4/18/2023  8:54 PM)  Pain Rating Post Med Admin: 10 (4/18/2023 12:34 PM)  Vinayak Score: 10 (4/18/2023  6:35 PM)

## 2023-04-19 NOTE — PROGRESS NOTES
[facility] S[facility]  Orthopedics  Progress Note    Patient Name: Maricel Amezquita  MRN: [id number]  Admission Date: 4/18/2023  Hospital Length of Stay: 0 days  Attending Provider: Buster Manuel Jr., MD  Primary Care Provider: No primary care provider on file.  Follow-up For: Procedure(s) (LRB):  ORIF, FINGER (Left)    Post-Operative Day: 1 Day Post-Op  Subjective:   Principal Orthopedic Problem:  Open fracture PIP joint left ring finger s/p  1.  Irrigation with excisional debridement of soft tissue including tendon and bone left ring finger.      2. Open reduction internal fixation proximal phalanx fracture of the left ring finger using K-wires    Interval History: Ms. Amezquita is POD #1. She notes moderate to intermittently severe pain to the left hand that is well managed with pain medications at the bedside.  She denies any acute complaints at present.    History and physical was aided by the assist of a  service.  The patient's mother was at the bedside.    Review of patient's allergies indicates:  No Known Allergies    Current Facility-Administered Medications   Medication    cefazolin (ANCEF) 2 gram in dextrose 5% 50 mL IVPB (premix)    ciprofloxacin (CIPRO)400mg/200ml D5W IVPB 400 mg    ondansetron injection 4 mg    oxyCODONE-acetaminophen 7.5-325 mg per tablet 1 tablet    traMADoL tablet 50 mg     Objective:     Vital Signs (Most Recent):  Temp: 97.5 °F (36.4 °C) (04/19/23 0748)  Pulse: 65 (04/19/23 0748)  Resp: 18 (04/19/23 0748)  BP: 118/70 (04/19/23 0748)  SpO2: 97 % (04/19/23 0748) Vital Signs (24h Range):  Temp:  [97.5 °F (36.4 °C)-98.3 °F (36.8 °C)] 97.5 °F (36.4 °C)  Pulse:  [59-94] 65  Resp:  [12-28] 18  SpO2:  [91 %-100 %] 97 %  BP: ()/(56-94) 118/70     Weight: 60.1 kg (132 lb 7.9 oz)  Height: 5' (152.4 cm)  Body mass index is 25.88 kg/m².      Intake/Output Summary (Last 24 hours) at 4/19/2023 1110  Last data filed at 4/19/2023 0424  Gross per 24 hour   Intake 1000 ml   Output 6 ml    Net 994 ml       Ortho/SPM Exam  Left upper extremity  Postop splint is in place and clean/dry/intact  Forearm compartments are soft and compressible  Sensation intact to light touch to index/middle/ring/small digits  Fingertips are warm and perfused  Extremity is elevated above the level of the heart      Assessment/Plan:     Active Diagnoses:    Diagnosis Date Noted POA    Open finger fracture [S62.609B] 04/18/2023 Yes      Problems Resolved During this Admission:   S/p I&D and ORIF of left ring finger proximal phalanx fracture    Nonweightbearing to the left hand  Upper extremity splint to be maintained at all times  ID: Ancef abx s/p open fracture  Encouraged extremity elevation above the level of the heart  Pain control: multimodal pain medication    Tiffanie Sims PA-C  Orthopedics  [facility] S[facility]

## 2023-04-19 NOTE — PLAN OF CARE
Problem: Adult Inpatient Plan of Care  Goal: Plan of Care Review  Outcome: Ongoing, Progressing    Chart and care plan reviewed

## 2023-04-20 VITALS
HEIGHT: 60 IN | HEART RATE: 68 BPM | BODY MASS INDEX: 26.97 KG/M2 | TEMPERATURE: 97 F | SYSTOLIC BLOOD PRESSURE: 107 MMHG | DIASTOLIC BLOOD PRESSURE: 61 MMHG | OXYGEN SATURATION: 98 % | WEIGHT: 137.38 LBS | RESPIRATION RATE: 19 BRPM

## 2023-04-20 PROCEDURE — 25000003 PHARM REV CODE 250: Performed by: ORTHOPAEDIC SURGERY

## 2023-04-20 PROCEDURE — 63600175 PHARM REV CODE 636 W HCPCS: Performed by: ORTHOPAEDIC SURGERY

## 2023-04-20 RX ORDER — AMOXICILLIN AND CLAVULANATE POTASSIUM 875; 125 MG/1; MG/1
1 TABLET, FILM COATED ORAL 2 TIMES DAILY
Qty: 14 TABLET | Refills: 1 | Status: SHIPPED | OUTPATIENT
Start: 2023-04-20 | End: 2023-04-27

## 2023-04-20 RX ORDER — OXYCODONE AND ACETAMINOPHEN 7.5; 325 MG/1; MG/1
1 TABLET ORAL EVERY 6 HOURS PRN
Qty: 40 TABLET | Refills: 0 | Status: SHIPPED | OUTPATIENT
Start: 2023-04-20 | End: 2023-05-01 | Stop reason: SDUPTHER

## 2023-04-20 RX ORDER — ONDANSETRON HYDROCHLORIDE 8 MG/1
8 TABLET, FILM COATED ORAL EVERY 8 HOURS PRN
Qty: 20 TABLET | Refills: 1 | Status: SHIPPED | OUTPATIENT
Start: 2023-04-20

## 2023-04-20 RX ADMIN — CIPROFLOXACIN 400 MG: 2 INJECTION, SOLUTION INTRAVENOUS at 08:04

## 2023-04-20 RX ADMIN — TRAMADOL HYDROCHLORIDE 50 MG: 50 TABLET, COATED ORAL at 08:04

## 2023-04-20 RX ADMIN — OXYCODONE AND ACETAMINOPHEN 1 TABLET: 7.5; 325 TABLET ORAL at 01:04

## 2023-04-20 RX ADMIN — ONDANSETRON HYDROCHLORIDE 4 MG: 2 INJECTION, SOLUTION INTRAMUSCULAR; INTRAVENOUS at 10:04

## 2023-04-20 RX ADMIN — OXYCODONE AND ACETAMINOPHEN 1 TABLET: 7.5; 325 TABLET ORAL at 12:04

## 2023-04-20 RX ADMIN — OXYCODONE AND ACETAMINOPHEN 1 TABLET: 7.5; 325 TABLET ORAL at 08:04

## 2023-04-20 RX ADMIN — MUPIROCIN: 20 OINTMENT TOPICAL at 08:04

## 2023-04-20 RX ADMIN — OXYCODONE AND ACETAMINOPHEN 1 TABLET: 7.5; 325 TABLET ORAL at 05:04

## 2023-04-20 NOTE — PLAN OF CARE
Introduced as VN and will be reviewing discharge instructions.  Educated patient on reason for admission, home medication list, and discharge instructions including when to return to ED and the following doctor appointments.  Education per flowsheet.  Opportunity given for questions and questions answered. Nurse  notified of   completion of discharge education. Patient waiting for wheelchair

## 2023-04-20 NOTE — PROGRESS NOTES
The sw spoke to Vandana([phone] to inform her the pt is listed as uninsured to see if she can screen the pt for Medicaid. Vandana states the pt is a Workman's Comp case.

## 2023-04-21 NOTE — DISCHARGE SUMMARY
[facility] S[facility]  Orthopedics  Discharge Summary      Patient Name: Maricel Amezquita  MRN: [id number]  Admission Date: 4/18/2023  Hospital Length of Stay: 1 days  Discharge Date and Time: 4/20/2023  1:42 PM  Attending Physician: No att. providers found   Discharging Provider: Buster Manuel Jr, MD  Primary Care Provider: No primary care provider on file.    HPI:  28-year-old female sustained severe laceration of her left ring finger as a result of a  injury   She had almost an amputation at the level of the PIP joint with complete loss of bone   She was admitted to the hospital emergently underwent surgical treatment on the day of admission    Procedure(s) (LRB):  ORIF, FINGER (Left)      Hospital Course:  Patient underwent surgical treatment on the day of admission which include irrigation debridement and stabilization of the PIP joint of the left ring finger with K-wire fixation.  Because of the severity of the injury the patient was admitted overnight for IV antibiotics   She received both Cipro and Ancef for 48 hours.  On postop day 2 the finger was noted to have good vascularity pain was well controlled she was felt ready for discharge home in stable condition        Significant Diagnostic Studies: No pertinent studies.    Pending Diagnostic Studies:       None          Final Active Diagnoses:    Diagnosis Date Noted POA    PRINCIPAL PROBLEM:  Open finger fracture [S62.609B] 04/18/2023 Yes      Problems Resolved During this Admission:      Discharged Condition: good    Disposition: Home or Self Care    Follow Up:   Follow-up Information       Cici Conteh PA-C Follow up on 5/1/2023.    Specialties: Hand Surgery, Orthopedic Surgery  Why: 8:00am 2 wk p/o left finger ORIF  Contact information:  [facility] [address] [address]  [phone]                           Patient Instructions:      SLING FOR HOME USE     Order Specific Question Answer Comments   Height: 5' (1.524 m)     Weight: 62.3 kg (137 lb 5.6 oz)    Does patient have medical equipment at home? none    Length of need (1-99 months): 2      Medications:  Reconciled Home Medications:      Medication List        START taking these medications      amoxicillin-clavulanate 875-125mg 875-125 mg per tablet  Commonly known as: AUGMENTIN  Take 1 tablet by mouth 2 (two) times a day. for 7 days     ondansetron 8 MG tablet  Commonly known as: ZOFRAN  Take 1 tablet (8 mg total) by mouth every 8 (eight) hours as needed for Nausea.     oxyCODONE-acetaminophen 7.5-325 mg per tablet  Commonly known as: PERCOCET  Take 1 tablet by mouth every 6 (six) hours as needed for Pain.              Buster Manuel Jr, MD  Orthopedics  [facility] S[facility]

## 2023-05-01 ENCOUNTER — HOSPITAL ENCOUNTER (OUTPATIENT)
Dept: RADIOLOGY | Facility: HOSPITAL | Age: 28
Discharge: HOME | End: 2023-05-01
Attending: ORTHOPAEDIC SURGERY

## 2023-05-01 ENCOUNTER — OFFICE VISIT (OUTPATIENT)
Dept: ORTHOPEDICS | Facility: CLINIC | Age: 28
End: 2023-05-01

## 2023-05-01 VITALS — HEIGHT: 60 IN | BODY MASS INDEX: 26.97 KG/M2 | WEIGHT: 137.38 LBS

## 2023-05-01 DIAGNOSIS — S62.625B OPEN DISPLACED FRACTURE OF MIDDLE PHALANX OF LEFT RING FINGER, INITIAL ENCOUNTER: ICD-10-CM

## 2023-05-01 DIAGNOSIS — Z87.81 S/P ORIF (OPEN REDUCTION INTERNAL FIXATION) FRACTURE: Primary | ICD-10-CM

## 2023-05-01 DIAGNOSIS — Z98.890 S/P ORIF (OPEN REDUCTION INTERNAL FIXATION) FRACTURE: Primary | ICD-10-CM

## 2023-05-01 DIAGNOSIS — S62.625D OPEN DISPLACED FRACTURE OF MIDDLE PHALANX OF LEFT RING FINGER WITH ROUTINE HEALING, SUBSEQUENT ENCOUNTER: ICD-10-CM

## 2023-05-01 PROCEDURE — 73140 XR FINGER 2 OR MORE VIEWS LEFT: ICD-10-PCS | Mod: 26,LT,, | Performed by: RADIOLOGY

## 2023-05-01 PROCEDURE — 99999 PR PBB SHADOW E&M-EST. PATIENT-LVL III: CPT | Mod: PBBFAC,,, | Performed by: ORTHOPAEDIC SURGERY

## 2023-05-01 PROCEDURE — 99024 PR POST-OP FOLLOW-UP VISIT: ICD-10-PCS | Mod: ,,, | Performed by: ORTHOPAEDIC SURGERY

## 2023-05-01 PROCEDURE — 99024 POSTOP FOLLOW-UP VISIT: CPT | Mod: ,,, | Performed by: ORTHOPAEDIC SURGERY

## 2023-05-01 PROCEDURE — 73140 X-RAY EXAM OF FINGER(S): CPT | Mod: 26,LT,, | Performed by: RADIOLOGY

## 2023-05-01 PROCEDURE — 73140 X-RAY EXAM OF FINGER(S): CPT | Mod: TC,PN,LT

## 2023-05-01 PROCEDURE — 99999 PR PBB SHADOW E&M-EST. PATIENT-LVL III: ICD-10-PCS | Mod: PBBFAC,,, | Performed by: ORTHOPAEDIC SURGERY

## 2023-05-01 PROCEDURE — 99213 OFFICE O/P EST LOW 20 MIN: CPT | Mod: PBBFAC,PN | Performed by: ORTHOPAEDIC SURGERY

## 2023-05-01 RX ORDER — OXYCODONE AND ACETAMINOPHEN 7.5; 325 MG/1; MG/1
1 TABLET ORAL EVERY 6 HOURS PRN
Qty: 40 TABLET | Refills: 0 | Status: SHIPPED | OUTPATIENT
Start: 2023-05-01

## 2023-05-01 NOTE — PROGRESS NOTES
Subjective:      Patient ID: Maricel Amezquita is a 28 y.o. female.    Chief Complaint: Post-op Evaluation (2 wk p/o- left finger ORIF )      HPI: Maricel Amezquita is here for a PO visit. She is 2 weeks s/p irrigation with excisional debridement of soft tissue including tendon and bone left ring finger and ORIF proximal phalanx fracture of the left ring finger using K-wires for complex open PIP fracture.     She reports pain control has been improving. PO complaints include:swelling, stiffness, pain.     No past medical history on file.    Current Outpatient Medications:     ondansetron (ZOFRAN) 8 MG tablet, Take 1 tablet (8 mg total) by mouth every 8 (eight) hours as needed for Nausea. (Patient not taking: Reported on 5/1/2023), Disp: 20 tablet, Rfl: 1    oxyCODONE-acetaminophen (PERCOCET) 7.5-325 mg per tablet, Take 1 tablet by mouth every 6 (six) hours as needed for Pain., Disp: 40 tablet, Rfl: 0  Review of patient's allergies indicates:  No Known Allergies    Ht 5' (1.524 m)   Wt 62.3 kg (137 lb 5.6 oz)   LMP 04/16/2023 (Exact Date)   BMI 26.82 kg/m²     ROS      Objective:    Ortho Exam       GEN: Well developed, well nourished female. AAOX3. No acute distress.   Normocephalic, atraumatic.   KIMBERLY  Breathing unlabored.  Mood and affect appropriate.   Left UE: Skin complex incision with sutures in place.  Wound margins are well approximated and healing there is no sinus infection.  There are 4 K-wires in the index finger. Swelling moderate. TTP global. ROM ROM of the left index finger are not tested, remaining fingers very limited secondary to pain and swelling. Sensation decreased. Tinels not performed. Pulses present. Cap refill within normal limits remaining fingers.  strength not tested. Special tests:  None    Assessment:     Imaging:  I have personally reviewed and interpreted the radiographs. Xray of the left hand shows postoperative changes with K-wires in place.  Alignment is unchanged from immediate  postop films.  No interval healing.          1. S/P ORIF (open reduction internal fixation) fracture    2. Open displaced fracture of middle phalanx of left ring finger with routine healing, subsequent encounter          Plan:       Orders Placed This Encounter    Ambulatory referral/consult to Physical/Occupational Therapy    oxyCODONE-acetaminophen (PERCOCET) 7.5-325 mg per tablet     X-rays reviewed with Dr. Manuel  Surgical splint and sutures removed today without complication.  Patient tolerated well.  Patient was educated on appropriate wound care and dressing changes to the finger and pins  She is placed in a bulky wrap, fitted and provided with ulnar gutter splint for full-time use.  Start physical therapy -- initially to work on range of motion of the remaining fingers.  No therapy should be applied to the ring finger while pins are in place.  Refill pain medication    Follow up in about 2 weeks (around 5/15/2023) for xrays .

## 2023-05-10 ENCOUNTER — TELEPHONE (OUTPATIENT)
Dept: ORTHOPEDICS | Facility: CLINIC | Age: 28
End: 2023-05-10

## 2023-05-10 DIAGNOSIS — S62.625B OPEN DISPLACED FRACTURE OF MIDDLE PHALANX OF LEFT RING FINGER, INITIAL ENCOUNTER: Primary | ICD-10-CM

## 2023-05-15 ENCOUNTER — OFFICE VISIT (OUTPATIENT)
Dept: ORTHOPEDICS | Facility: CLINIC | Age: 28
End: 2023-05-15

## 2023-05-15 ENCOUNTER — HOSPITAL ENCOUNTER (OUTPATIENT)
Dept: RADIOLOGY | Facility: HOSPITAL | Age: 28
Discharge: HOME | End: 2023-05-15
Attending: ORTHOPAEDIC SURGERY

## 2023-05-15 VITALS — BODY MASS INDEX: 26.9 KG/M2 | WEIGHT: 137 LBS | HEIGHT: 60 IN

## 2023-05-15 DIAGNOSIS — S62.625B OPEN DISPLACED FRACTURE OF MIDDLE PHALANX OF LEFT RING FINGER, INITIAL ENCOUNTER: ICD-10-CM

## 2023-05-15 DIAGNOSIS — S62.609D: Primary | ICD-10-CM

## 2023-05-15 PROCEDURE — 73140 X-RAY EXAM OF FINGER(S): CPT | Mod: TC,PN,LT

## 2023-05-15 PROCEDURE — 99999 PR PBB SHADOW E&M-EST. PATIENT-LVL III: CPT | Mod: PBBFAC,,, | Performed by: ORTHOPAEDIC SURGERY

## 2023-05-15 PROCEDURE — 73140 X-RAY EXAM OF FINGER(S): CPT | Mod: 26,LT,, | Performed by: RADIOLOGY

## 2023-05-15 PROCEDURE — 99024 POSTOP FOLLOW-UP VISIT: CPT | Mod: ,,, | Performed by: ORTHOPAEDIC SURGERY

## 2023-05-15 PROCEDURE — 99024 PR POST-OP FOLLOW-UP VISIT: ICD-10-PCS | Mod: ,,, | Performed by: ORTHOPAEDIC SURGERY

## 2023-05-15 PROCEDURE — 99999 PR PBB SHADOW E&M-EST. PATIENT-LVL III: ICD-10-PCS | Mod: PBBFAC,,, | Performed by: ORTHOPAEDIC SURGERY

## 2023-05-15 PROCEDURE — 73140 XR FINGER 2 OR MORE VIEWS LEFT: ICD-10-PCS | Mod: 26,LT,, | Performed by: RADIOLOGY

## 2023-05-15 PROCEDURE — 99213 OFFICE O/P EST LOW 20 MIN: CPT | Mod: PBBFAC,PN | Performed by: ORTHOPAEDIC SURGERY

## 2023-05-15 RX ORDER — AMOXICILLIN AND CLAVULANATE POTASSIUM 875; 125 MG/1; MG/1
1 TABLET, FILM COATED ORAL DAILY
Qty: 14 TABLET | Refills: 1 | Status: SHIPPED | OUTPATIENT
Start: 2023-05-15

## 2023-05-15 NOTE — PROGRESS NOTES
Subjective:      Patient ID: Maricel Amezquita is a 28 y.o. female.  Chief Complaint: Post-op Evaluation of the Left Hand      HPI  Maricel Amezquita is a  28 y.o. female presenting today for post op visit.  She is s/p open reduction internal fixation of left ring finger with near-complete amputation she is now about 4 weeks postop she does have multiple pins in place.     Review of patient's allergies indicates:  No Known Allergies      Current Outpatient Medications   Medication Sig Dispense Refill    ondansetron (ZOFRAN) 8 MG tablet Take 1 tablet (8 mg total) by mouth every 8 (eight) hours as needed for Nausea. 20 tablet 1    oxyCODONE-acetaminophen (PERCOCET) 7.5-325 mg per tablet Take 1 tablet by mouth every 6 (six) hours as needed for Pain. 40 tablet 0     No current facility-administered medications for this visit.       No past medical history on file.    Past Surgical History:   Procedure Laterality Date    OPEN REDUCTION AND INTERNAL FIXATION (ORIF) OF INJURY OF FINGER Left 4/18/2023    Procedure: ORIF, FINGER;  Surgeon: Buster Manuel Jr., MD;  Location: [facility];  Service: Orthopedics;  Laterality: Left;  need accumed finger plate and mini c arm       OBJECTIVE:   PHYSICAL EXAM:  Height: 5' (152.4 cm) Weight: 62.1 kg (137 lb)  Vitals:    05/15/23 1513   Weight: 62.1 kg (137 lb)   Height: 5' (1.524 m)   PainSc:   4     Ortho/SPM Exam  Examination left ring finger the tip appears viable no evidence of infection pins in place alignment looks good    RADIOGRAPHS:  AP lateral x-rays show acceptable alignment to the ring finger with severe bone loss of the PIP joint  Comments: I have personally reviewed the imaging and I agree with the above radiologist's report.    ASSESSMENT/PLAN:     IMPRESSION:  Status post ORIF left ring finger with near complete amputation    PLAN:  1 of the loose pins was removed today in the office  Otherwise continue with local pin care and a finger brace   OT ordered   No heavy  lifting    FOLLOW UP:  1-2 weeks and we will remove the pins as they get loose    Disclaimer: This note has been generated using voice-recognition software. There may be typographical errors that have been missed during proof-reading.

## 2023-05-19 ENCOUNTER — TELEPHONE (OUTPATIENT)
Dept: ORTHOPEDICS | Facility: CLINIC | Age: 28
End: 2023-05-19

## 2023-05-19 DIAGNOSIS — S62.625B OPEN DISPLACED FRACTURE OF MIDDLE PHALANX OF LEFT RING FINGER, INITIAL ENCOUNTER: Primary | ICD-10-CM

## 2023-05-22 ENCOUNTER — HOSPITAL ENCOUNTER (OUTPATIENT)
Dept: RADIOLOGY | Facility: HOSPITAL | Age: 28
Discharge: HOME | End: 2023-05-22
Attending: ORTHOPAEDIC SURGERY

## 2023-05-22 ENCOUNTER — OFFICE VISIT (OUTPATIENT)
Dept: ORTHOPEDICS | Facility: CLINIC | Age: 28
End: 2023-05-22

## 2023-05-22 VITALS — HEIGHT: 60 IN | BODY MASS INDEX: 26.76 KG/M2

## 2023-05-22 DIAGNOSIS — S62.625B OPEN DISPLACED FRACTURE OF MIDDLE PHALANX OF LEFT RING FINGER, INITIAL ENCOUNTER: ICD-10-CM

## 2023-05-22 DIAGNOSIS — S62.609D: Primary | ICD-10-CM

## 2023-05-22 PROCEDURE — 99999 PR PBB SHADOW E&M-EST. PATIENT-LVL III: ICD-10-PCS | Mod: PBBFAC,,, | Performed by: ORTHOPAEDIC SURGERY

## 2023-05-22 PROCEDURE — 99213 OFFICE O/P EST LOW 20 MIN: CPT | Mod: PBBFAC,PN | Performed by: ORTHOPAEDIC SURGERY

## 2023-05-22 PROCEDURE — 73140 X-RAY EXAM OF FINGER(S): CPT | Mod: 26,LT,, | Performed by: RADIOLOGY

## 2023-05-22 PROCEDURE — 73140 X-RAY EXAM OF FINGER(S): CPT | Mod: TC,PN,LT

## 2023-05-22 PROCEDURE — 99024 PR POST-OP FOLLOW-UP VISIT: ICD-10-PCS | Mod: ,,, | Performed by: ORTHOPAEDIC SURGERY

## 2023-05-22 PROCEDURE — 99999 PR PBB SHADOW E&M-EST. PATIENT-LVL III: CPT | Mod: PBBFAC,,, | Performed by: ORTHOPAEDIC SURGERY

## 2023-05-22 PROCEDURE — 99024 POSTOP FOLLOW-UP VISIT: CPT | Mod: ,,, | Performed by: ORTHOPAEDIC SURGERY

## 2023-05-22 PROCEDURE — 73140 XR FINGER 2 OR MORE VIEWS LEFT: ICD-10-PCS | Mod: 26,LT,, | Performed by: RADIOLOGY

## 2023-05-22 NOTE — PROGRESS NOTES
Subjective:      Patient ID: Maricel Amezquita is a 28 y.o. female.  Chief Complaint: Follow-up (Left finger fractre)      HPI  Maricel Amezquita is a  28 y.o. female presenting today for post op visit.  She is s/p ORIF open fracture of the left ring finger with bone loss at the PIP joint she is now about 5 weeks postop she is doing well she does have multiple pins in place.     Review of patient's allergies indicates:  No Known Allergies      Current Outpatient Medications   Medication Sig Dispense Refill    amoxicillin-clavulanate 875-125mg (AUGMENTIN) 875-125 mg per tablet Take 1 tablet by mouth once daily. 14 tablet 1    ondansetron (ZOFRAN) 8 MG tablet Take 1 tablet (8 mg total) by mouth every 8 (eight) hours as needed for Nausea. 20 tablet 1    oxyCODONE-acetaminophen (PERCOCET) 7.5-325 mg per tablet Take 1 tablet by mouth every 6 (six) hours as needed for Pain. 40 tablet 0     No current facility-administered medications for this visit.       No past medical history on file.    Past Surgical History:   Procedure Laterality Date    OPEN REDUCTION AND INTERNAL FIXATION (ORIF) OF INJURY OF FINGER Left 4/18/2023    Procedure: ORIF, FINGER;  Surgeon: Buster Manuel Jr., MD;  Location: [facility];  Service: Orthopedics;  Laterality: Left;  need accumed finger plate and mini c arm       OBJECTIVE:   PHYSICAL EXAM:  Height: 5' (152.4 cm)    Vitals:    05/22/23 1424   Height: 5' (1.524 m)   PainSc:   5   PainLoc: Finger     Ortho/SPM Exam  Examination of the left ring finger shows healing skin dorsally minimal swelling no evidence of infection 4 pins in place    RADIOGRAPHS:  AP lateral x-ray left ring finger shows acceptable alignment with bone loss of the PIP joint pins in place  Comments: I have personally reviewed the imaging and I agree with the above radiologist's report.    ASSESSMENT/PLAN:     IMPRESSION:  Status post ORIF left ring finger with bone loss PIP joint    PLAN:  1 pin removed in the office today  instructed in appropriate wound care including gentle soap and water  OT reordered again for her left hand   Continue with use of the hand and fingers splint no heavy lifting remain off work    FOLLOW UP:  1-2 weeks    Disclaimer: This note has been generated using voice-recognition software. There may be typographical errors that have been missed during proof-reading.

## 2023-05-24 ENCOUNTER — TELEPHONE (OUTPATIENT)
Dept: ORTHOPEDICS | Facility: CLINIC | Age: 28
End: 2023-05-24

## 2023-05-24 DIAGNOSIS — S62.625B OPEN DISPLACED FRACTURE OF MIDDLE PHALANX OF LEFT RING FINGER, INITIAL ENCOUNTER: Primary | ICD-10-CM

## 2023-05-29 ENCOUNTER — HOSPITAL ENCOUNTER (OUTPATIENT)
Dept: RADIOLOGY | Facility: HOSPITAL | Age: 28
Discharge: HOME | End: 2023-05-29
Attending: ORTHOPAEDIC SURGERY

## 2023-05-29 ENCOUNTER — OFFICE VISIT (OUTPATIENT)
Dept: ORTHOPEDICS | Facility: CLINIC | Age: 28
End: 2023-05-29

## 2023-05-29 VITALS — BODY MASS INDEX: 26.76 KG/M2 | HEIGHT: 60 IN

## 2023-05-29 DIAGNOSIS — S62.625B OPEN DISPLACED FRACTURE OF MIDDLE PHALANX OF LEFT RING FINGER, INITIAL ENCOUNTER: ICD-10-CM

## 2023-05-29 DIAGNOSIS — S62.609D: Primary | ICD-10-CM

## 2023-05-29 PROCEDURE — 73140 X-RAY EXAM OF FINGER(S): CPT | Mod: 26,LT,, | Performed by: RADIOLOGY

## 2023-05-29 PROCEDURE — 99212 OFFICE O/P EST SF 10 MIN: CPT | Mod: PBBFAC,PN | Performed by: ORTHOPAEDIC SURGERY

## 2023-05-29 PROCEDURE — 73140 XR FINGER 2 OR MORE VIEWS LEFT: ICD-10-PCS | Mod: 26,LT,, | Performed by: RADIOLOGY

## 2023-05-29 PROCEDURE — 73140 X-RAY EXAM OF FINGER(S): CPT | Mod: TC,PN,LT

## 2023-05-29 PROCEDURE — 99024 PR POST-OP FOLLOW-UP VISIT: ICD-10-PCS | Mod: ,,, | Performed by: ORTHOPAEDIC SURGERY

## 2023-05-29 PROCEDURE — 99999 PR PBB SHADOW E&M-EST. PATIENT-LVL II: ICD-10-PCS | Mod: PBBFAC,,, | Performed by: ORTHOPAEDIC SURGERY

## 2023-05-29 PROCEDURE — 99999 PR PBB SHADOW E&M-EST. PATIENT-LVL II: CPT | Mod: PBBFAC,,, | Performed by: ORTHOPAEDIC SURGERY

## 2023-05-29 PROCEDURE — 99024 POSTOP FOLLOW-UP VISIT: CPT | Mod: ,,, | Performed by: ORTHOPAEDIC SURGERY

## 2023-05-29 NOTE — PROGRESS NOTES
Subjective:      Patient ID: Maricel Amezquita is a 28 y.o. female.  Chief Complaint: Fracture (Left ring finger fracture)      HPI  Maricel Amezquita is a  28 y.o. female presenting today for post op visit.  She is s/p ORIF severe open fracture PIP joint left ring finger is now 6 weeks postop 2 pins remain doing well pain reported less.     Review of patient's allergies indicates:  No Known Allergies      Current Outpatient Medications   Medication Sig Dispense Refill    amoxicillin-clavulanate 875-125mg (AUGMENTIN) 875-125 mg per tablet Take 1 tablet by mouth once daily. 14 tablet 1    ondansetron (ZOFRAN) 8 MG tablet Take 1 tablet (8 mg total) by mouth every 8 (eight) hours as needed for Nausea. 20 tablet 1    oxyCODONE-acetaminophen (PERCOCET) 7.5-325 mg per tablet Take 1 tablet by mouth every 6 (six) hours as needed for Pain. 40 tablet 0     No current facility-administered medications for this visit.       No past medical history on file.    Past Surgical History:   Procedure Laterality Date    OPEN REDUCTION AND INTERNAL FIXATION (ORIF) OF INJURY OF FINGER Left 4/18/2023    Procedure: ORIF, FINGER;  Surgeon: Buster Manuel Jr., MD;  Location: [facility] OR;  Service: Orthopedics;  Laterality: Left;  need accumed finger plate and mini c arm       OBJECTIVE:   PHYSICAL EXAM:  Height: 5' (152.4 cm)    Vitals:    05/29/23 1330   Height: 5' (1.524 m)   PainSc:   4   PainLoc: Finger     Ortho/SPM Exam  Examination left ring finger 1 of the pins as loose removed in the office today without difficulty   No evidence of infection       RADIOGRAPHS:  AP lateral x-ray left ring finger show pins in place deformity of the joint with bone loss  Comments: I have personally reviewed the imaging and I agree with the above radiologist's report.    ASSESSMENT/PLAN:     IMPRESSION:  Status post ORIF left ring finger with bone loss PIP joint    PLAN:  Continue local wound care gentle soap and water including pins ulnar gutter splint   OT  ordered   No heavy lifting       FOLLOW UP:  1-2 weeks for final pin removal    Disclaimer: This note has been generated using voice-recognition software. There may be typographical errors that have been missed during proof-reading.

## 2023-06-01 NOTE — PLAN OF CARE
Pt AAOx4. PRN percocet given for complaints of pain. PRN zofran given x1 for nausea. Medications administered per MAR. On RA. Dressing to L hand CDI. L extremity elevated. Bed alarm set, side rails raised, and call light in reach.   Topical Retinoid Pregnancy And Lactation Text: This medication is Pregnancy Category C. It is unknown if this medication is excreted in breast milk.

## 2023-06-02 ENCOUNTER — TELEPHONE (OUTPATIENT)
Dept: ORTHOPEDICS | Facility: CLINIC | Age: 28
End: 2023-06-02

## 2023-06-02 DIAGNOSIS — S62.625B OPEN DISPLACED FRACTURE OF MIDDLE PHALANX OF LEFT RING FINGER, INITIAL ENCOUNTER: Primary | ICD-10-CM

## 2023-06-05 ENCOUNTER — OFFICE VISIT (OUTPATIENT)
Dept: ORTHOPEDICS | Facility: CLINIC | Age: 28
End: 2023-06-05

## 2023-06-05 ENCOUNTER — HOSPITAL ENCOUNTER (OUTPATIENT)
Dept: RADIOLOGY | Facility: HOSPITAL | Age: 28
Discharge: HOME | End: 2023-06-05
Attending: ORTHOPAEDIC SURGERY

## 2023-06-05 VITALS — HEIGHT: 60 IN | BODY MASS INDEX: 26.76 KG/M2

## 2023-06-05 DIAGNOSIS — S62.625B OPEN DISPLACED FRACTURE OF MIDDLE PHALANX OF LEFT RING FINGER, INITIAL ENCOUNTER: ICD-10-CM

## 2023-06-05 DIAGNOSIS — S62.609D: Primary | ICD-10-CM

## 2023-06-05 PROCEDURE — 99024 POSTOP FOLLOW-UP VISIT: CPT | Mod: ,,, | Performed by: ORTHOPAEDIC SURGERY

## 2023-06-05 PROCEDURE — 99999 PR PBB SHADOW E&M-EST. PATIENT-LVL III: CPT | Mod: PBBFAC,,, | Performed by: ORTHOPAEDIC SURGERY

## 2023-06-05 PROCEDURE — 99024 PR POST-OP FOLLOW-UP VISIT: ICD-10-PCS | Mod: ,,, | Performed by: ORTHOPAEDIC SURGERY

## 2023-06-05 PROCEDURE — 99213 OFFICE O/P EST LOW 20 MIN: CPT | Mod: PBBFAC,PN | Performed by: ORTHOPAEDIC SURGERY

## 2023-06-05 PROCEDURE — 73140 X-RAY EXAM OF FINGER(S): CPT | Mod: 26,LT,, | Performed by: RADIOLOGY

## 2023-06-05 PROCEDURE — 73140 X-RAY EXAM OF FINGER(S): CPT | Mod: TC,PN,LT

## 2023-06-05 PROCEDURE — 99999 PR PBB SHADOW E&M-EST. PATIENT-LVL III: ICD-10-PCS | Mod: PBBFAC,,, | Performed by: ORTHOPAEDIC SURGERY

## 2023-06-05 PROCEDURE — 73140 XR FINGER 2 OR MORE VIEWS LEFT: ICD-10-PCS | Mod: 26,LT,, | Performed by: RADIOLOGY

## 2023-06-05 NOTE — PROGRESS NOTES
Subjective:      Patient ID: Maricel Amezquita is a 28 y.o. female.  Chief Complaint: Post-op Evaluation (Left ring finger fx)      HPI  Maricel Amezquita is a  28 y.o. female presenting today for post op visit.  She is s/p .  ORIF severe open fracture PIP joint of the left ring finger with pin fixation she is now about 7 weeks postop has 1 pin remaining no evidence or history of infection of the fingers    Review of patient's allergies indicates:  No Known Allergies      Current Outpatient Medications   Medication Sig Dispense Refill    amoxicillin-clavulanate 875-125mg (AUGMENTIN) 875-125 mg per tablet Take 1 tablet by mouth once daily. 14 tablet 1    ondansetron (ZOFRAN) 8 MG tablet Take 1 tablet (8 mg total) by mouth every 8 (eight) hours as needed for Nausea. 20 tablet 1    oxyCODONE-acetaminophen (PERCOCET) 7.5-325 mg per tablet Take 1 tablet by mouth every 6 (six) hours as needed for Pain. 40 tablet 0     No current facility-administered medications for this visit.       No past medical history on file.    Past Surgical History:   Procedure Laterality Date    OPEN REDUCTION AND INTERNAL FIXATION (ORIF) OF INJURY OF FINGER Left 4/18/2023    Procedure: ORIF, FINGER;  Surgeon: Buster Manuel Jr., MD;  Location: [facility];  Service: Orthopedics;  Laterality: Left;  need accumed finger plate and mini c arm       OBJECTIVE:   PHYSICAL EXAM:  Height: 5' (152.4 cm)    Vitals:    06/05/23 1335   Height: 5' (1.524 m)   PainSc:   5   PainLoc: Finger     Ortho/SPM Exam  Examination of the left ring finger the skin is healing dorsally no evidence of infection swelling minimal 1 pin in place no evidence of infection alignment is marginal    RADIOGRAPHS:  AP lateral x-rays show some bone loss at the PIP joint  Comments: I have personally reviewed the imaging and I agree with the above radiologist's report.    ASSESSMENT/PLAN:     IMPRESSION:  Status post ORIF severe open fracture left ring finger    PLAN:  Remaining pin removed  without difficulty.  Continue with local wound care and use of the ulnar gutter splint   Moving forward we may need to consider revision surgery once the skin is healed for possible fusion of the PIP joint    FOLLOW UP:  3-4 weeks    Disclaimer: This note has been generated using voice-recognition software. There may be typographical errors that have been missed during proof-reading.

## 2023-06-22 DIAGNOSIS — S62.609D: Primary | ICD-10-CM

## 2023-06-26 ENCOUNTER — HOSPITAL ENCOUNTER (OUTPATIENT)
Dept: RADIOLOGY | Facility: HOSPITAL | Age: 28
Discharge: HOME | End: 2023-06-26
Attending: PHYSICIAN ASSISTANT

## 2023-06-26 ENCOUNTER — OFFICE VISIT (OUTPATIENT)
Dept: ORTHOPEDICS | Facility: CLINIC | Age: 28
End: 2023-06-26

## 2023-06-26 VITALS — WEIGHT: 136.88 LBS | BODY MASS INDEX: 26.87 KG/M2 | HEIGHT: 60 IN

## 2023-06-26 DIAGNOSIS — Z98.890 S/P ORIF (OPEN REDUCTION INTERNAL FIXATION) FRACTURE: Primary | ICD-10-CM

## 2023-06-26 DIAGNOSIS — S62.609D: ICD-10-CM

## 2023-06-26 DIAGNOSIS — Z87.81 S/P ORIF (OPEN REDUCTION INTERNAL FIXATION) FRACTURE: Primary | ICD-10-CM

## 2023-06-26 PROCEDURE — 99024 PR POST-OP FOLLOW-UP VISIT: ICD-10-PCS | Mod: ,,, | Performed by: PHYSICIAN ASSISTANT

## 2023-06-26 PROCEDURE — 99999 PR PBB SHADOW E&M-EST. PATIENT-LVL III: CPT | Mod: PBBFAC,,, | Performed by: PHYSICIAN ASSISTANT

## 2023-06-26 PROCEDURE — 73140 XR FINGER 2 OR MORE VIEWS LEFT: ICD-10-PCS | Mod: 26,LT,, | Performed by: RADIOLOGY

## 2023-06-26 PROCEDURE — 99213 OFFICE O/P EST LOW 20 MIN: CPT | Mod: PBBFAC,PN | Performed by: PHYSICIAN ASSISTANT

## 2023-06-26 PROCEDURE — 73140 X-RAY EXAM OF FINGER(S): CPT | Mod: TC,PN,LT

## 2023-06-26 PROCEDURE — 73140 X-RAY EXAM OF FINGER(S): CPT | Mod: 26,LT,, | Performed by: RADIOLOGY

## 2023-06-26 PROCEDURE — 99999 PR PBB SHADOW E&M-EST. PATIENT-LVL III: ICD-10-PCS | Mod: PBBFAC,,, | Performed by: PHYSICIAN ASSISTANT

## 2023-06-26 PROCEDURE — 99024 POSTOP FOLLOW-UP VISIT: CPT | Mod: ,,, | Performed by: PHYSICIAN ASSISTANT

## 2023-06-26 NOTE — PROGRESS NOTES
Subjective:      Patient ID: Maricel Amezquita is a 28 y.o. female.  Chief Complaint: Post-op Evaluation (2 mth p/o- left ring finger ORIF )      HPI  Maricel Amezquita is a  28 y.o. female presenting today for post op visit.      She is s/p ORIF complex open fracture PIP joint of the left ring finger with pin fixation she is now about 2.5 months postop.  The pin was removed uneventfully at her previous appointment.  She has mild-to-moderate pain during flexion.  She has been wearing her ulnar gutter brace at all times.  Patient notes stiffness to digit.  She has not begun formal physical therapy yet.      Review of patient's allergies indicates:  No Known Allergies      Current Outpatient Medications   Medication Sig Dispense Refill    amoxicillin-clavulanate 875-125mg (AUGMENTIN) 875-125 mg per tablet Take 1 tablet by mouth once daily. 14 tablet 1    ondansetron (ZOFRAN) 8 MG tablet Take 1 tablet (8 mg total) by mouth every 8 (eight) hours as needed for Nausea. 20 tablet 1    oxyCODONE-acetaminophen (PERCOCET) 7.5-325 mg per tablet Take 1 tablet by mouth every 6 (six) hours as needed for Pain. 40 tablet 0     No current facility-administered medications for this visit.       No past medical history on file.    Past Surgical History:   Procedure Laterality Date    OPEN REDUCTION AND INTERNAL FIXATION (ORIF) OF INJURY OF FINGER Left 4/18/2023    Procedure: ORIF, FINGER;  Surgeon: Buster Manuel Jr., MD;  Location: [facility];  Service: Orthopedics;  Laterality: Left;  need accumed finger plate and mini c arm       OBJECTIVE:   PHYSICAL EXAM:  Height: 5' (152.4 cm) Weight: 62.1 kg (136 lb 14.5 oz)  Vitals:    06/26/23 1321   Weight: 62.1 kg (136 lb 14.5 oz)   Height: 5' (1.524 m)   PainSc:   3   PainLoc: Finger       Ortho/SPM Exam  Examination of the left ring finger   Surgical incisions are healed with scar formation  Mild swelling primarily overlying PIP joint  Visible deformity with ulnar tilt of digit at PIP  "joint  Mild tenderness to proximal and middle phalanx  Previous surgical pin has been removed.  Sensation intact distally  Brisk cap refill to finger pads    RADIOGRAPHS:  3v XR L ring digit shows interval healing to oblique fracture of proximal phalanx.  Mild bone loss can be seen.  No further interval displacement noted    ASSESSMENT/PLAN:     IMPRESSION:  Status post ORIF severe open fracture left ring finger    PLAN:    The proximal phalanx does show a small amount of healing compared to previous images.  However, the patient is becoming quite stiff to all digits of the left hand.    OT: Formal referral provided with emphasis on range of motion.  We also discussed at length that the patient should be passively flexing and extending all digits at home.    Activity: Avoid heavy lifting, pushing and pulling    Patient may slowly discontinue upper extremity brace.  She was encouraged to continue the brace for heavy lifting only    FOLLOW UP:  4-6 weeks    Per Dr. Manuel's previous note: "Moving forward we may need to consider revision surgery once the skin is healed for possible fusion of the PIP joint"  "

## 2023-08-16 ENCOUNTER — TELEPHONE (OUTPATIENT)
Dept: ORTHOPEDICS | Facility: CLINIC | Age: 28
End: 2023-08-16

## 2023-08-16 DIAGNOSIS — S62.609D: Primary | ICD-10-CM

## 2023-08-17 ENCOUNTER — OFFICE VISIT (OUTPATIENT)
Dept: ORTHOPEDICS | Facility: CLINIC | Age: 28
End: 2023-08-17

## 2023-08-17 ENCOUNTER — HOSPITAL ENCOUNTER (OUTPATIENT)
Dept: RADIOLOGY | Facility: HOSPITAL | Age: 28
Discharge: HOME | End: 2023-08-17
Attending: ORTHOPAEDIC SURGERY

## 2023-08-17 VITALS — BODY MASS INDEX: 26.7 KG/M2 | HEIGHT: 60 IN | WEIGHT: 136 LBS

## 2023-08-17 DIAGNOSIS — S62.609K: ICD-10-CM

## 2023-08-17 DIAGNOSIS — S62.609D: ICD-10-CM

## 2023-08-17 PROCEDURE — 73140 X-RAY EXAM OF FINGER(S): CPT | Mod: TC,PN,LT

## 2023-08-17 PROCEDURE — 73140 X-RAY EXAM OF FINGER(S): CPT | Mod: 26,LT,, | Performed by: RADIOLOGY

## 2023-08-17 PROCEDURE — 99214 OFFICE O/P EST MOD 30 MIN: CPT | Mod: S$PBB,,, | Performed by: ORTHOPAEDIC SURGERY

## 2023-08-17 PROCEDURE — 99214 PR OFFICE/OUTPT VISIT, EST, LEVL IV, 30-39 MIN: ICD-10-PCS | Mod: S$PBB,,, | Performed by: ORTHOPAEDIC SURGERY

## 2023-08-17 PROCEDURE — 99999 PR PBB SHADOW E&M-EST. PATIENT-LVL III: ICD-10-PCS | Mod: PBBFAC,,, | Performed by: ORTHOPAEDIC SURGERY

## 2023-08-17 PROCEDURE — 73140 XR FINGER 2 OR MORE VIEWS LEFT: ICD-10-PCS | Mod: 26,LT,, | Performed by: RADIOLOGY

## 2023-08-17 PROCEDURE — 99999 PR PBB SHADOW E&M-EST. PATIENT-LVL III: CPT | Mod: PBBFAC,,, | Performed by: ORTHOPAEDIC SURGERY

## 2023-08-17 PROCEDURE — 99213 OFFICE O/P EST LOW 20 MIN: CPT | Mod: PBBFAC,PN | Performed by: ORTHOPAEDIC SURGERY

## 2023-08-17 RX ORDER — CEFAZOLIN SODIUM 2 G/50ML
2 SOLUTION INTRAVENOUS
Status: CANCELLED | OUTPATIENT
Start: 2023-08-17

## 2023-08-17 NOTE — PROGRESS NOTES
CC:  Proximal phalanx nonunion left ring finger        HPI:  Maricel Amezquita is a very pleasant 28 y.o. female with ongoing symptoms left ring finger after previous severe open fracture of the PIP joint left ring finger   She has recovered from the soft tissue injuries but continues to have some pain            PAST MEDICAL HISTORY: No past medical history on file.  PAST SURGICAL HISTORY:   Past Surgical History:   Procedure Laterality Date    OPEN REDUCTION AND INTERNAL FIXATION (ORIF) OF INJURY OF FINGER Left 4/18/2023    Procedure: ORIF, FINGER;  Surgeon: Buster Manuel Jr., MD;  Location: [facility];  Service: Orthopedics;  Laterality: Left;  need accumed finger plate and mini c arm     FAMILY HISTORY: No family history on file.  SOCIAL HISTORY:   Social History     Socioeconomic History    Marital status: Single   Tobacco Use    Smoking status: Never    Smokeless tobacco: Never       MEDICATIONS:   Current Outpatient Medications:     amoxicillin-clavulanate 875-125mg (AUGMENTIN) 875-125 mg per tablet, Take 1 tablet by mouth once daily., Disp: 14 tablet, Rfl: 1    ondansetron (ZOFRAN) 8 MG tablet, Take 1 tablet (8 mg total) by mouth every 8 (eight) hours as needed for Nausea., Disp: 20 tablet, Rfl: 1    oxyCODONE-acetaminophen (PERCOCET) 7.5-325 mg per tablet, Take 1 tablet by mouth every 6 (six) hours as needed for Pain., Disp: 40 tablet, Rfl: 0  ALLERGIES: Review of patient's allergies indicates:  No Known Allergies    Review of Systems:  Constitutional: no fever or chills  ENT: no nasal congestion or sore throat  Respiratory: no cough or shortness of breath  Cardiovascular: no chest pain or palpitations  Gastrointestinal: no nausea or vomiting, PUD, GERD, NSAID intolerance  Genitourinary: no hematuria or dysuria  Integument/Breast: no rash or pruritis  Hematologic/Lymphatic: no easy bruising or lymphadenopathy  Musculoskeletal: see HPI  Neurological: no seizures or tremors  Behavioral/Psych: no auditory or  visual hallucinations      Physical Exam   Vitals:    08/17/23 1432   Weight: 61.7 kg (136 lb)   Height: 5' (1.524 m)   PainSc: 0-No pain       Constitutional: Oriented to person, place, and time. Appears well-developed and well-nourished.   HENT:   Head: Normocephalic and atraumatic.   Nose: Nose normal.   Eyes: No scleral icterus.   Neck: Normal range of motion.   Cardiovascular: Normal rate and regular rhythm.    Pulses:       Radial pulses are 2+ on the right side, and 2+ on the left side.   Pulmonary/Chest: Effort normal and breath sounds normal.   Abdominal: Soft.   Neurological: Alert and oriented to person, place, and time.   Skin: Skin is warm.   Psychiatric: Normal mood and affect.     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  The consent was signed in the office for  "surgery.      Buster Manuel MD (Jay)  [facility]  Orthopedic Upper Extremity Surgery       "

## 2023-09-19 ENCOUNTER — TELEPHONE (OUTPATIENT)
Dept: ORTHOPEDICS | Facility: CLINIC | Age: 28
End: 2023-09-19

## 2023-09-19 ENCOUNTER — ANESTHESIA EVENT (OUTPATIENT)
Dept: SURGERY | Facility: HOSPITAL | Age: 28
End: 2023-09-19

## 2023-09-19 ENCOUNTER — ANESTHESIA (OUTPATIENT)
Dept: SURGERY | Facility: HOSPITAL | Age: 28
End: 2023-09-19

## 2023-09-19 ENCOUNTER — HOSPITAL ENCOUNTER (OUTPATIENT)
Facility: HOSPITAL | Age: 28
Discharge: HOME | End: 2023-09-19
Attending: ORTHOPAEDIC SURGERY | Admitting: ORTHOPAEDIC SURGERY

## 2023-09-19 VITALS
DIASTOLIC BLOOD PRESSURE: 61 MMHG | TEMPERATURE: 98 F | SYSTOLIC BLOOD PRESSURE: 109 MMHG | OXYGEN SATURATION: 97 % | HEART RATE: 65 BPM | RESPIRATION RATE: 16 BRPM

## 2023-09-19 DIAGNOSIS — S62.609K: ICD-10-CM

## 2023-09-19 LAB
B-HCG UR QL: NEGATIVE
CTP QC/QA: YES

## 2023-09-19 PROCEDURE — 64415 NJX AA&/STRD BRCH PLXS IMG: CPT | Performed by: ANESTHESIOLOGY

## 2023-09-19 PROCEDURE — D9220A PRA ANESTHESIA: Mod: ,,, | Performed by: ANESTHESIOLOGY

## 2023-09-19 PROCEDURE — 27800903 OPTIME MED/SURG SUP & DEVICES OTHER IMPLANTS: Performed by: ORTHOPAEDIC SURGERY

## 2023-09-19 PROCEDURE — 87070 CULTURE OTHR SPECIMN AEROBIC: CPT | Performed by: ORTHOPAEDIC SURGERY

## 2023-09-19 PROCEDURE — 63600175 PHARM REV CODE 636 W HCPCS: Performed by: NURSE ANESTHETIST, CERTIFIED REGISTERED

## 2023-09-19 PROCEDURE — 63600175 PHARM REV CODE 636 W HCPCS: Performed by: ANESTHESIOLOGY

## 2023-09-19 PROCEDURE — 37000009 HC ANESTHESIA EA ADD 15 MINS: Performed by: ORTHOPAEDIC SURGERY

## 2023-09-19 PROCEDURE — 71000016 HC POSTOP RECOV ADDL HR: Performed by: ORTHOPAEDIC SURGERY

## 2023-09-19 PROCEDURE — 87102 FUNGUS ISOLATION CULTURE: CPT | Performed by: ORTHOPAEDIC SURGERY

## 2023-09-19 PROCEDURE — 26546 REPAIR NONUNION HAND: CPT | Mod: F3,,, | Performed by: ORTHOPAEDIC SURGERY

## 2023-09-19 PROCEDURE — 36000708 HC OR TIME LEV III 1ST 15 MIN: Performed by: ORTHOPAEDIC SURGERY

## 2023-09-19 PROCEDURE — 71000015 HC POSTOP RECOV 1ST HR: Performed by: ORTHOPAEDIC SURGERY

## 2023-09-19 PROCEDURE — 87206 SMEAR FLUORESCENT/ACID STAI: CPT | Performed by: ORTHOPAEDIC SURGERY

## 2023-09-19 PROCEDURE — 63600175 PHARM REV CODE 636 W HCPCS: Performed by: ORTHOPAEDIC SURGERY

## 2023-09-19 PROCEDURE — 37000008 HC ANESTHESIA 1ST 15 MINUTES: Performed by: ORTHOPAEDIC SURGERY

## 2023-09-19 PROCEDURE — C1713 ANCHOR/SCREW BN/BN,TIS/BN: HCPCS | Performed by: ORTHOPAEDIC SURGERY

## 2023-09-19 PROCEDURE — D9220A PRA ANESTHESIA: ICD-10-PCS | Mod: ,,, | Performed by: ANESTHESIOLOGY

## 2023-09-19 PROCEDURE — 26546 PR FIX NONUNION METACARPAL/PHALANX: ICD-10-PCS | Mod: F3,,, | Performed by: ORTHOPAEDIC SURGERY

## 2023-09-19 PROCEDURE — 27201423 OPTIME MED/SURG SUP & DEVICES STERILE SUPPLY: Performed by: ORTHOPAEDIC SURGERY

## 2023-09-19 PROCEDURE — 87116 MYCOBACTERIA CULTURE: CPT | Performed by: ORTHOPAEDIC SURGERY

## 2023-09-19 PROCEDURE — 87075 CULTR BACTERIA EXCEPT BLOOD: CPT | Performed by: ORTHOPAEDIC SURGERY

## 2023-09-19 PROCEDURE — 25000003 PHARM REV CODE 250: Performed by: NURSE ANESTHETIST, CERTIFIED REGISTERED

## 2023-09-19 PROCEDURE — 36000709 HC OR TIME LEV III EA ADD 15 MIN: Performed by: ORTHOPAEDIC SURGERY

## 2023-09-19 PROCEDURE — 64415 PERIPHERAL BLOCK: ICD-10-PCS | Mod: 59,LT,, | Performed by: ANESTHESIOLOGY

## 2023-09-19 DEVICE — GRAFT PRIME DBM HD BONE 1.0CC: Type: IMPLANTABLE DEVICE | Site: FINGER | Status: FUNCTIONAL

## 2023-09-19 DEVICE — IMPLANTABLE DEVICE: Type: IMPLANTABLE DEVICE | Site: FINGER | Status: FUNCTIONAL

## 2023-09-19 RX ORDER — OXYCODONE HYDROCHLORIDE 5 MG/1
5 TABLET ORAL
Status: CANCELLED | OUTPATIENT
Start: 2023-09-19

## 2023-09-19 RX ORDER — OXYCODONE HYDROCHLORIDE 5 MG/1
10 TABLET ORAL EVERY 4 HOURS PRN
Status: CANCELLED | OUTPATIENT
Start: 2023-09-19

## 2023-09-19 RX ORDER — CEFAZOLIN SODIUM 2 G/50ML
2 SOLUTION INTRAVENOUS
Status: DISCONTINUED | OUTPATIENT
Start: 2023-09-19 | End: 2023-09-19 | Stop reason: HOSPADM

## 2023-09-19 RX ORDER — ACETAMINOPHEN 325 MG/1
650 TABLET ORAL EVERY 4 HOURS PRN
Status: CANCELLED | OUTPATIENT
Start: 2023-09-19

## 2023-09-19 RX ORDER — VANCOMYCIN HYDROCHLORIDE 1 G/20ML
INJECTION, POWDER, LYOPHILIZED, FOR SOLUTION INTRAVENOUS
Status: DISCONTINUED | OUTPATIENT
Start: 2023-09-19 | End: 2023-09-19 | Stop reason: HOSPADM

## 2023-09-19 RX ORDER — KETOROLAC TROMETHAMINE 30 MG/ML
30 INJECTION, SOLUTION INTRAMUSCULAR; INTRAVENOUS ONCE
Status: CANCELLED | OUTPATIENT
Start: 2023-09-19 | End: 2023-09-22

## 2023-09-19 RX ORDER — MIDAZOLAM HYDROCHLORIDE 1 MG/ML
INJECTION, SOLUTION INTRAMUSCULAR; INTRAVENOUS
Status: DISCONTINUED | OUTPATIENT
Start: 2023-09-19 | End: 2023-09-19

## 2023-09-19 RX ORDER — ROPIVACAINE HYDROCHLORIDE 5 MG/ML
INJECTION, SOLUTION EPIDURAL; INFILTRATION; PERINEURAL
Status: COMPLETED | OUTPATIENT
Start: 2023-09-19 | End: 2023-09-19

## 2023-09-19 RX ORDER — ONDANSETRON 2 MG/ML
INJECTION INTRAMUSCULAR; INTRAVENOUS
Status: DISCONTINUED | OUTPATIENT
Start: 2023-09-19 | End: 2023-09-19

## 2023-09-19 RX ORDER — HYDROMORPHONE HYDROCHLORIDE 2 MG/ML
0.5 INJECTION, SOLUTION INTRAMUSCULAR; INTRAVENOUS; SUBCUTANEOUS EVERY 5 MIN PRN
Status: CANCELLED | OUTPATIENT
Start: 2023-09-19

## 2023-09-19 RX ORDER — PROPOFOL 10 MG/ML
VIAL (ML) INTRAVENOUS
Status: DISCONTINUED | OUTPATIENT
Start: 2023-09-19 | End: 2023-09-19

## 2023-09-19 RX ORDER — ONDANSETRON 2 MG/ML
4 INJECTION INTRAMUSCULAR; INTRAVENOUS DAILY PRN
Status: CANCELLED | OUTPATIENT
Start: 2023-09-19

## 2023-09-19 RX ORDER — LIDOCAINE HYDROCHLORIDE 20 MG/ML
INJECTION INTRAVENOUS
Status: DISCONTINUED | OUTPATIENT
Start: 2023-09-19 | End: 2023-09-19

## 2023-09-19 RX ORDER — ONDANSETRON 8 MG/1
8 TABLET, ORALLY DISINTEGRATING ORAL EVERY 8 HOURS PRN
Status: CANCELLED | OUTPATIENT
Start: 2023-09-19

## 2023-09-19 RX ORDER — PROPOFOL 10 MG/ML
VIAL (ML) INTRAVENOUS CONTINUOUS PRN
Status: DISCONTINUED | OUTPATIENT
Start: 2023-09-19 | End: 2023-09-19

## 2023-09-19 RX ORDER — HYDROCODONE BITARTRATE AND ACETAMINOPHEN 7.5; 325 MG/1; MG/1
1 TABLET ORAL EVERY 4 HOURS PRN
Qty: 30 TABLET | Refills: 0 | Status: SHIPPED | OUTPATIENT
Start: 2023-09-19

## 2023-09-19 RX ADMIN — LIDOCAINE HYDROCHLORIDE 50 MG: 20 INJECTION, SOLUTION INTRAVENOUS at 02:09

## 2023-09-19 RX ADMIN — MIDAZOLAM 2 MG: 1 INJECTION INTRAMUSCULAR; INTRAVENOUS at 12:09

## 2023-09-19 RX ADMIN — ROPIVACAINE HYDROCHLORIDE 20 ML: 5 INJECTION, SOLUTION EPIDURAL; INFILTRATION; PERINEURAL at 12:09

## 2023-09-19 RX ADMIN — SODIUM CHLORIDE: 0.9 INJECTION, SOLUTION INTRAVENOUS at 02:09

## 2023-09-19 RX ADMIN — CEFAZOLIN SODIUM 2 G: 2 SOLUTION INTRAVENOUS at 02:09

## 2023-09-19 RX ADMIN — ONDANSETRON 8 MG: 2 INJECTION, SOLUTION INTRAMUSCULAR; INTRAVENOUS at 02:09

## 2023-09-19 RX ADMIN — PROPOFOL 150 MCG/KG/MIN: 10 INJECTION, EMULSION INTRAVENOUS at 02:09

## 2023-09-19 RX ADMIN — PROPOFOL 100 MG: 10 INJECTION, EMULSION INTRAVENOUS at 02:09

## 2023-09-19 NOTE — OP NOTE
[facility] - Surgery (Hospital)  Operative Note      Date of Procedure: 9/19/2023     Procedure: Procedure(s) (LRB):  ORIF, FINGER (Left)     Surgeon(s) and Role:     * Buster Manuel Jr., MD - Primary    Assisting Surgeon: None    Pre-Operative Diagnosis: Fracture of phalanx of finger of left hand with nonunion [S62.609K]    Post-Operative Diagnosis: Post-Op Diagnosis Codes:     * Fracture of phalanx of finger of left hand with nonunion [S62.609K]    Anesthesia: Regional    Operative Findings (including complications, if any):  Nonunion proximal phalanx fracture left ring finger    Description of Technical Procedures:     Preop diagnosis:  Nonunion proximal phalanx fracture left ring finger.      Postop diagnosis: Same.    Operative procedure:  Open reduction internal fixation of nonunion fracture proximal phalanx left ring finger using [employer] locking plate and allograft bone graft.      Surgeon: Kaleb.      Anesthesia: Regional block.      EBL:  Minimal.      Specimen:  Cultures.      Complications:  None.      Operative procedure in detail as follows:    After operative consent was obtained patient brought the operating room placed supine on the operating room table.  Anesthesia by regional block performed by the anesthesia staff.  A tourniquet applied to the left arm left upper extremity prepped and draped out in the normal sterile fashion.  The Esmarch used to exsanguinated the limb and the tourniquet inflated 225 mmHg.      Following this a dorsal curved incision made over the left ring finger with a 15 blade previous scars were incorporated into the incision and was extended proximally and distally in a lazy-S pattern.  Dissection was carried straight down to bone to avoid any excessive undermining of the skin edges which were marginal at best.  There was no evidence of infection full-thickness flaps were raised including subperiosteal layer.      The fracture site was identified and noted to be at the  distal 3rd of the proximal phalanx with a atrophic nonunion which was mobile.  The PIP joint itself had auto fused with very poor articular cartilage and no motion it was flexed about 10 or 15°.      Cultures were taken but there was no evidence of infection.      Was felt that the nonunion site should be flex down to perform a osteophytes the synthesis in 45° of flexion to allow for a functional finger that it was fused in this position.      The fracture site was taken down callus and old tissue removed and 2 bone cuts were made with the oscillating saw to give a correct angle and rotation incorporating about a 45 degree flexion angle.  This was a corrected several times checked under C-arm control.      After the correct cuts had been made a Med Chaim pre contoured and prebent plate was fashioned at 45° of flexion and placed over the dorsal cortex of the proximal and middle phalanges.  Was adjusted several times to improve the rotational alignment which was very difficult to recreate.  However after plating the fracture under compression the alignment was felt to be acceptable.  The screws were placed in both locking and nonlocking mode and achieved good purchase the C-arm was brought in to confirm the the appropriate position of the plate and the screw lengths appropriate.  The wound then thoroughly irrigated with antibiotic saline solution.  Allograft bone graft was then placed in a putty fashion around the nonunion site.  As noted earlier the PIP joint had auto fused and was not addressed.      The deep tissue periosteum and tendon all closed in a single running layer using 4-0 running Vicryl interlocked sutures.  The skin was closed with a running 5 O nylon horizontal mattress suture.  Sterile dressing applied followed by well-padded ulnar gutter splint and the tourniquet deflated patient then brought to the recovery room in stable condition all sponge needle counts reported as correct no  complications    Significant Surgical Tasks Conducted by the Assistant(s), if Applicable: na    Estimated Blood Loss (EBL): * No values recorded between 9/19/2023  2:27 PM and 9/19/2023  4:08 PM *           Implants:   Implant Name Type Inv. Item Serial No.  Lot No. LRB No. Used Action   LOCKING PLATES - BLUE 2.0/2.3    [employer]  Left 1 Implanted   DRILL BITS FOR HAND 2.0/2.3    [employer]  Left 1 Implanted   GOLD 2.0 CORTEX BONE SCREWS    [employer]  Left 2 Implanted   GOLD 2.0 CORTEX BONE SCREWS    [employer]  Left 2 Implanted   GOLD 2.0 CORTEX BONE SCREWS    [employer]  Left 1 Implanted   GOLD 2.0 CORTEX BONE SCREWS    [employer]  Left 3 Implanted and Explanted   BLUE 2.0 LOCKING SCREWS    [employer]  Left 1 Implanted   BLUE 2.0 LOCKING SCREWS    [employer]  Left 3 Implanted   [id number]  GRAFT PRIME DBM HD BONE 1.0CC [id number]  Left 1 Implanted       Specimens:   Specimen (24h ago, onward)      None                    Condition: Good    Disposition: PACU - hemodynamically stable.    Attestation: I was present and scrubbed for the entire procedure.    Discharge Note    OUTCOME: Patient tolerated treatment/procedure well without complication and is now ready for discharge.    DISPOSITION: Home or Self Care    FINAL DIAGNOSIS:  Fracture of phalanx of finger of left hand with nonunion    FOLLOWUP: In clinic    DISCHARGE INSTRUCTIONS:    Discharge Procedure Orders   Diet general     Leave dressing on - Keep it clean, dry, and intact until clinic visit     Call MD for:  temperature >100.4     Call MD for:  persistent nausea and vomiting     Call MD for:  severe uncontrolled pain     Keep surgical extremity elevated

## 2023-09-19 NOTE — DISCHARGE INSTRUCTIONS
After Hand Surgery  After surgery, the better you take care of yourself--especially your hand--the sooner it will heal. Follow your surgeons instructions. Try not to bump your hand, and dont move or lift anything while youre still wearing bandages, a splint, or a cast.    Care for your hand    Keep your hand elevated above heart level as much as possible for the first several days after surgery. This helps reduce swelling and pain.  To help prevent infection and speed healing, take care not to get your cast or bandages wet.    Relieve pain as directed  Your surgeon may prescribe pain medicine or suggest you take an anti-inflammatory medicine. You might also be instructed to apply ice (or another cold source) to your hand. If you use ice cubes, put them in a plastic bag and rest it on top of your bandages. Leave the cold source on your hand for as long as its comfortable. Do this several times a day for the first few days after surgery. It may take several minutes before you can feel the cold through the cast or bandages.    Follow up with your surgeon  During a follow-up visit after surgery, your surgeon will check your progress. The stitches, bandages, splint, or cast may be removed. A new cast or splint may be placed. If your hand has healed enough, your surgeon may prescribe exercises.    Call your surgeon if you have...  A fever higher than 100.4°F (38.0°C) taken by mouth  Side effects from your medicine, such as prolonged nausea  A wet or loose dressing, or a dressing that is too tight  Excessive bleeding  Increased, ongoing pain or numbness  Signs of infection (such as drainage, warmth, or redness) at the incision site      ANESTHESIA  -For the first 24 hours after surgery:  Do not drive, use heavy equipment, make important decisions, or drink alcohol  -It is normal to feel sleepy for several hours.  Rest until you are more awake.  -Have someone stay with you, if needed.  They can watch for problems and  help keep you safe.  -Some possible post anesthesia side effects include: nausea and vomiting, sore throat and hoarseness, sleepiness, and dizziness.    PAIN  -If you have pain after surgery, pain medicine will help you feel better.  Take it as directed, before pain becomes severe.  Most pain relievers taken by mouth need at least 20-30 minutes to start working.  -Do not drive or drink alcohol while taking pain medicine.  -Pain medication can upset your stomach.  Taking them with a little food may help.  -Other ways to help control pain: elevation, ice, and relaxation  -Call your surgeon if still having unmanageable pain an hour after taking pain medicine.  -Pain medicine can cause constipation.  Taking an over-the counter stool softener while on prescription pain medicine and drinking plenty of fluids can prevent this side effect.  -Call your surgeon if you have severe side effects like: breathing problems, trouble waking up, dizziness, confusion, or severe constipation.    NAUSEA  -Some people have nausea after surgery.  This is often because of anesthesia, pain, pain medicine, or the stress of surgery.  -Do not push yourself to eat.  Start off with clear liquids and soup.  Slowly move to solid foods.  Don't eat fatty, rich, spicy foods at first.  Eat smaller amounts.  -If you develop persistent nausea and vomiting please notify your surgeon immediately.    BLEEDING  -Different types of surgery require different types of care and dressing changes.  It is important to follow all instructions and advice from your surgeon.  Change dressing as directed.  Call your surgeon for any concerns regarding postop bleeding.    SIGNS OF INFECTION  -Signs of infection include: fever, swelling, drainage, and redness  -Notify your surgeon if you have a fever of 100.4 F (38.0 C) or higher.  -Notify your surgeon if you notice redness, swelling, increased pain, pus, or a foul smell at the incision site.     Notify Dr. Manuel for any  questions or concerns.

## 2023-09-19 NOTE — TELEPHONE ENCOUNTER
Spoke with patient. Patient was in the doctors office, I informed patient that she needs to go to the hospital side of the building to check in for her surgery. Patient verbalized understanding.

## 2023-09-19 NOTE — H&P
CC:  Proximal phalanx nonunion left ring finger           HPI:  Maricel Amezquita is a very pleasant 28 y.o. female with ongoing symptoms left ring finger after previous severe open fracture of the PIP joint left ring finger   She has recovered from the soft tissue injuries but continues to have some pain               PAST MEDICAL HISTORY: No past medical history on file.  PAST SURGICAL HISTORY:         Past Surgical History:   Procedure Laterality Date    OPEN REDUCTION AND INTERNAL FIXATION (ORIF) OF INJURY OF FINGER Left 4/18/2023     Procedure: ORIF, FINGER;  Surgeon: Buster Manuel Jr., MD;  Location: [facility];  Service: Orthopedics;  Laterality: Left;  need accumed finger plate and mini c arm      FAMILY HISTORY: No family history on file.  SOCIAL HISTORY:   Social History           Socioeconomic History    Marital status: Single   Tobacco Use    Smoking status: Never    Smokeless tobacco: Never         MEDICATIONS:   Current Outpatient Medications:     amoxicillin-clavulanate 875-125mg (AUGMENTIN) 875-125 mg per tablet, Take 1 tablet by mouth once daily., Disp: 14 tablet, Rfl: 1    ondansetron (ZOFRAN) 8 MG tablet, Take 1 tablet (8 mg total) by mouth every 8 (eight) hours as needed for Nausea., Disp: 20 tablet, Rfl: 1    oxyCODONE-acetaminophen (PERCOCET) 7.5-325 mg per tablet, Take 1 tablet by mouth every 6 (six) hours as needed for Pain., Disp: 40 tablet, Rfl: 0  ALLERGIES: Review of patient's allergies indicates:  No Known Allergies     Review of Systems:  Constitutional: no fever or chills  ENT: no nasal congestion or sore throat  Respiratory: no cough or shortness of breath  Cardiovascular: no chest pain or palpitations  Gastrointestinal: no nausea or vomiting, PUD, GERD, NSAID intolerance  Genitourinary: no hematuria or dysuria  Integument/Breast: no rash or pruritis  Hematologic/Lymphatic: no easy bruising or lymphadenopathy  Musculoskeletal: see HPI  Neurological: no seizures or  tremors  Behavioral/Psych: no auditory or visual hallucinations        Physical Exam       Vitals:     08/17/23 1432   Weight: 61.7 kg (136 lb)   Height: 5' (1.524 m)   PainSc: 0-No pain         Constitutional: Oriented to person, place, and time. Appears well-developed and well-nourished.   HENT:   Head: Normocephalic and atraumatic.   Nose: Nose normal.   Eyes: No scleral icterus.   Neck: Normal range of motion.   Cardiovascular: Normal rate and regular rhythm.    Pulses:       Radial pulses are 2+ on the right side, and 2+ on the left side.   Pulmonary/Chest: Effort normal and breath sounds normal.   Abdominal: Soft.   Neurological: Alert and oriented to person, place, and time.   Skin: Skin is warm.   Psychiatric: Normal mood and affect.      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  The consent was signed in the office for surgery.        Buster Manuel MD (Jay)  [facility]  Orthopedic Upper Extremity Surgery  "

## 2023-09-19 NOTE — TELEPHONE ENCOUNTER
----- Message from Mary Ellen Pérez sent at 9/19/2023 10:59 AM CDT -----  Type:  Patient Returning Call    Who Called:PT   Would the patient rather a call back or a response via [facility]? Yes   Best Call Back Number:[phone]  Additional Information: is at the hospital for surgery and they say she is not on the schedule   '

## 2023-09-19 NOTE — PLAN OF CARE
Left supraclavicular block performed at bedside per anesthesia.  Versed 2 mg given per anesthesia.  Patient tolerated well.  No complications noted. Patient awiting surgery with friend at bedside.

## 2023-09-19 NOTE — TRANSFER OF CARE
Anesthesia Transfer of Care Note    Patient: Maricel Amezquita    Procedure(s) Performed: Procedure(s) (LRB):  ORIF, FINGER (Left)    Patient location: OPS    Anesthesia Type: general    Transport from OR: Transported from OR on 6-10 L/min O2 by face mask with adequate spontaneous ventilation    Post pain: adequate analgesia    Post assessment: no apparent anesthetic complications    Post vital signs: stable    Level of consciousness: awake and alert    Nausea/Vomiting: no nausea/vomiting    Complications: none    Transfer of care protocol was followed      Last vitals:   Visit Vitals  /69   Pulse (!) 59   Resp 16   LMP 09/04/2023   SpO2 98%   Breastfeeding No

## 2023-09-19 NOTE — PLAN OF CARE
Pt sister arrived. Discharge instructions reviewed by . Pt and family all verbalize understanding, have no further questions. Discharged home in good condition with family.

## 2023-09-19 NOTE — ANESTHESIA PREPROCEDURE EVALUATION
09/19/2023     Maricel Amezquita is a 28 y.o., female.    No past medical history on file.  Past Surgical History:   Procedure Laterality Date    OPEN REDUCTION AND INTERNAL FIXATION (ORIF) OF INJURY OF FINGER Left 4/18/2023    Procedure: ORIF, FINGER;  Surgeon: Buster Manuel Jr., MD;  Location: [facility];  Service: Orthopedics;  Laterality: Left;  need accumed finger plate and mini c arm           Pre-op Assessment    I have reviewed the Patient Summary Reports.     I have reviewed the Nursing Notes. I have reviewed the NPO Status.      Review of Systems  Anesthesia Hx:  History of prior surgery of interest to airway management or planning:  Denies Personal Hx of Anesthesia complications.   Cardiovascular:   Exercise tolerance: good    Hepatic/GI:  Hepatic/GI Normal    Endocrine:  Endocrine Normal        Physical Exam  General: Well nourished    Airway:  Mallampati: II   Mouth Opening: Normal  Neck ROM: Normal ROM        Anesthesia Plan  Type of Anesthesia, risks & benefits discussed:    Anesthesia Type: Gen Natural Airway, Regional  Informed Consent: Informed consent signed with the Patient and all parties understand the risks and agree with anesthesia plan.  All questions answered.   ASA Score: 2    Ready For Surgery From Anesthesia Perspective.     .

## 2023-09-19 NOTE — ANESTHESIA PROCEDURE NOTES
Peripheral Block    Patient location during procedure: pre-op   Block not for primary anesthetic.  Reason for block: at surgeon's request and post-op pain management   Post-op Pain Location: Left Finger   Start time: 9/19/2023 12:41 PM  Timeout: 9/19/2023 12:40 PM   End time: 9/19/2023 12:46 PM    Staffing  Authorizing Provider: Kenneth Ortiz MD  Performing Provider: Kenneth Ortiz MD    Staffing  Performed by: Kenneth Ortiz MD  Authorized by: Kenneth Ortiz MD    Preanesthetic Checklist  Completed: patient identified, IV checked, site marked, risks and benefits discussed, surgical consent, monitors and equipment checked, pre-op evaluation and timeout performed  Peripheral Block  Patient position: supine  Prep: ChloraPrep  Patient monitoring: heart rate, cardiac monitor, continuous pulse ox, continuous capnometry and frequent blood pressure checks  Block type: supraclavicular  Laterality: left  Injection technique: single shot  Needle  Needle type: Stimuplex   Needle gauge: 22 G  Needle length: 2 in  Needle localization: anatomical landmarks and ultrasound guidance   -ultrasound image captured on disc.  Assessment  Injection assessment: negative aspiration, negative parasthesia and local visualized surrounding nerve  Paresthesia pain: none  Heart rate change: no  Slow fractionated injection: yes    Medications:    Medications: ropivacaine (NAROPIN) injection 0.5% - Perineural   20 mL - 9/19/2023 12:41:00 PM    Additional Notes  VSS.  [facility] RN monitoring vitals throughout procedure.  Patient tolerated procedure well.

## 2023-09-22 LAB — BACTERIA SPEC AEROBE CULT: NO GROWTH

## 2023-09-26 LAB — BACTERIA SPEC ANAEROBE CULT: NORMAL

## 2023-09-27 NOTE — PROGRESS NOTES
Subjective:      Patient ID: Maricel Amezquita is a 28 y.o. female.    Chief Complaint: No chief complaint on file.      HPI: Maricel Amezquita is here for a postop visit. She is 2 weeks s/pOpen reduction internal fixation of nonunion fracture proximal phalanx left ring finger using [employer] locking plate and allograft bone graft. DOS: 9-.     She is doing well. Pt reports pain has been controlled.  Post surgical complaints include: none.     No past medical history on file.    Current Outpatient Medications:     amoxicillin-clavulanate 875-125mg (AUGMENTIN) 875-125 mg per tablet, Take 1 tablet by mouth once daily., Disp: 14 tablet, Rfl: 1    HYDROcodone-acetaminophen (NORCO) 7.5-325 mg per tablet, Take 1 tablet by mouth every 4 (four) hours as needed for Pain., Disp: 30 tablet, Rfl: 0    ondansetron (ZOFRAN) 8 MG tablet, Take 1 tablet (8 mg total) by mouth every 8 (eight) hours as needed for Nausea., Disp: 20 tablet, Rfl: 1    oxyCODONE-acetaminophen (PERCOCET) 7.5-325 mg per tablet, Take 1 tablet by mouth every 6 (six) hours as needed for Pain., Disp: 40 tablet, Rfl: 0  Review of patient's allergies indicates:  No Known Allergies    LMP 09/04/2023     Review of Systems   Constitutional:  Negative for chills and fever.   Respiratory:  Negative for shortness of breath.    Cardiovascular:  Negative for chest pain and leg swelling.   Gastrointestinal:  Negative for nausea and vomiting.   Genitourinary:  Negative for dysuria.   Musculoskeletal:  Negative for falls.   Skin:  Negative for rash.   Neurological:  Negative for dizziness and sensory change.           Objective:    Ortho Exam   LEFT RING DIGIT:  Surgical wound margins are well approximated and healing nicely. No redness, warmth, drainage, or other signs of infection.  strength not tested today.  Sensation intact. Pulses present.    GEN: Well developed, well nourished female. AAOX3. No acute distress.   Breathing unlabored.  Mood and affect appropriate.        Imaging:  3v  XR L hand shows:  There is side plate stabilizing a fracture of the proximal phalanx of the 4th digit.  There is mild angulation radially.  No hardware failure seen.  No significant complication seen.    Agree with the above radiology findings    Assessment:         1. S/P ORIF (open reduction internal fixation) fracture    2. 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  DME: ulnar gutter brace applied. Ok to remove during hygiene and at rest  Pt encouraged on AROM/PROM to non-operative digits  Pain control: OTC analgesics, elevation, rest    Follow Up: 4wks with repeat XR L ring digit

## 2023-10-03 ENCOUNTER — HOSPITAL ENCOUNTER (OUTPATIENT)
Dept: RADIOLOGY | Facility: HOSPITAL | Age: 28
Discharge: HOME | End: 2023-10-03
Attending: PHYSICIAN ASSISTANT

## 2023-10-03 ENCOUNTER — OFFICE VISIT (OUTPATIENT)
Dept: ORTHOPEDICS | Facility: CLINIC | Age: 28
End: 2023-10-03

## 2023-10-03 VITALS — WEIGHT: 136 LBS | BODY MASS INDEX: 26.7 KG/M2 | HEIGHT: 60 IN

## 2023-10-03 DIAGNOSIS — S62.609K: ICD-10-CM

## 2023-10-03 DIAGNOSIS — S62.609K: Primary | ICD-10-CM

## 2023-10-03 DIAGNOSIS — Z87.81 S/P ORIF (OPEN REDUCTION INTERNAL FIXATION) FRACTURE: Primary | ICD-10-CM

## 2023-10-03 DIAGNOSIS — Z98.890 S/P ORIF (OPEN REDUCTION INTERNAL FIXATION) FRACTURE: Primary | ICD-10-CM

## 2023-10-03 PROCEDURE — 99024 PR POST-OP FOLLOW-UP VISIT: ICD-10-PCS | Mod: ,,, | Performed by: PHYSICIAN ASSISTANT

## 2023-10-03 PROCEDURE — 73140 XR FINGER 2 OR MORE VIEWS LEFT: ICD-10-PCS | Mod: 26,LT,, | Performed by: RADIOLOGY

## 2023-10-03 PROCEDURE — 73140 X-RAY EXAM OF FINGER(S): CPT | Mod: TC,PN,LT

## 2023-10-03 PROCEDURE — 99999 PR PBB SHADOW E&M-EST. PATIENT-LVL III: ICD-10-PCS | Mod: PBBFAC,,, | Performed by: PHYSICIAN ASSISTANT

## 2023-10-03 PROCEDURE — 73140 X-RAY EXAM OF FINGER(S): CPT | Mod: 26,LT,, | Performed by: RADIOLOGY

## 2023-10-03 PROCEDURE — 99213 OFFICE O/P EST LOW 20 MIN: CPT | Mod: PBBFAC,PN | Performed by: PHYSICIAN ASSISTANT

## 2023-10-03 PROCEDURE — 99999 PR PBB SHADOW E&M-EST. PATIENT-LVL III: CPT | Mod: PBBFAC,,, | Performed by: PHYSICIAN ASSISTANT

## 2023-10-03 PROCEDURE — 99024 POSTOP FOLLOW-UP VISIT: CPT | Mod: ,,, | Performed by: PHYSICIAN ASSISTANT

## 2023-10-19 LAB — FUNGUS SPEC CULT: NORMAL

## 2023-11-07 LAB
ACID FAST MOD KINY STN SPEC: NORMAL
MYCOBACTERIUM SPEC QL CULT: NORMAL

## (undated) DEVICE — GLOVE SURG BIOGEL LATEX SZ 7.5

## (undated) DEVICE — PAD PREP CUFFED NS 24X48IN

## (undated) DEVICE — GOWN POLY REINF BRTH SLV LG

## (undated) DEVICE — DRESSING XEROFORM NONADH 1X8IN

## (undated) DEVICE — BANDAGE SOFFORM STER 2IN

## (undated) DEVICE — SEE L#120831

## (undated) DEVICE — Device

## (undated) DEVICE — DRAPE MINI C-ARM 54 X 64

## (undated) DEVICE — BANDAGE ACE NON LATEX 2IN

## (undated) DEVICE — STOCKINET 4INX48

## (undated) DEVICE — TOURNIQUET SB QC DP 18X4IN

## (undated) DEVICE — ALCOHOL 70% ISOP W/GREEN 16OZ

## (undated) DEVICE — PACK SURGERY START

## (undated) DEVICE — BLADE SAW OSC NAR MED

## (undated) DEVICE — PAD CAST 2 IN X 4YDS STERILE

## (undated) DEVICE — GAUZE NON WOVEN NS 4PLY 4X4IN

## (undated) DEVICE — APPLICATOR CHLORAPREP ORN 26ML

## (undated) DEVICE — COVER OVERHEAD SURG LT BLUE

## (undated) DEVICE — BANDAGE ESMARK ELASTIC ST 4X9

## (undated) DEVICE — PADDING CAST 2IN DELTA ROLL

## (undated) DEVICE — SUT ETHILON 5-0 PS-2 18IN

## (undated) DEVICE — BLADE SCALP OPHTL BEVEL STR

## (undated) DEVICE — BANDAGE ELASTIC ACE 2IN 10/CA

## (undated) DEVICE — PACK BASIC

## (undated) DEVICE — GOWN POLY REINF BRTH SLV XL

## (undated) DEVICE — DRAPE HAND STERILE

## (undated) DEVICE — PADDING CAST 3 X 4YD

## (undated) DEVICE — DRESSING XEROFORM 1X8IN

## (undated) DEVICE — SLING ARM COMFT NAVY BLU LG

## (undated) DEVICE — SUT VICRYL 4-0 27 SH

## (undated) DEVICE — MANIFOLD 4 PORT

## (undated) DEVICE — DRAPE STERI INSTRUMENT 1018

## (undated) DEVICE — DRESSING XEROFORM FOIL PK 1X8

## (undated) DEVICE — SPONGE KERLIX ANTIMIC 6X6.75IN

## (undated) DEVICE — BANDAGE ELASTIC 3IN ACE NS

## (undated) DEVICE — NDL HYPO REG 25G X 1 1/2

## (undated) DEVICE — GAUZE AVANT SPNG 4PLY STRL 4X4

## (undated) DEVICE — SPLINT PLASTER F.S. 3INX15IN

## (undated) DEVICE — SEE MEDLINE ITEM 146322

## (undated) DEVICE — APPLICATOR CHLORAPREP CLR 3ML